# Patient Record
Sex: FEMALE | Race: WHITE | URBAN - METROPOLITAN AREA
[De-identification: names, ages, dates, MRNs, and addresses within clinical notes are randomized per-mention and may not be internally consistent; named-entity substitution may affect disease eponyms.]

---

## 2017-07-03 ENCOUNTER — OFFICE VISIT (OUTPATIENT)
Dept: INTERNAL MEDICINE CLINIC | Age: 27
End: 2017-07-03

## 2017-07-03 VITALS
BODY MASS INDEX: 33.3 KG/M2 | HEART RATE: 86 BPM | SYSTOLIC BLOOD PRESSURE: 106 MMHG | OXYGEN SATURATION: 97 % | WEIGHT: 191 LBS | DIASTOLIC BLOOD PRESSURE: 70 MMHG

## 2017-07-03 DIAGNOSIS — L30.9 DERMATITIS: Primary | ICD-10-CM

## 2017-07-03 PROCEDURE — 99213 OFFICE O/P EST LOW 20 MIN: CPT | Performed by: NURSE PRACTITIONER

## 2017-07-03 RX ORDER — METHYLPREDNISOLONE 4 MG/1
TABLET ORAL
Qty: 1 KIT | Refills: 0 | Status: SHIPPED | OUTPATIENT
Start: 2017-07-03 | End: 2019-06-14 | Stop reason: ALTCHOICE

## 2017-07-21 ENCOUNTER — TELEPHONE (OUTPATIENT)
Dept: INTERNAL MEDICINE CLINIC | Age: 27
End: 2017-07-21

## 2019-04-02 ENCOUNTER — OFFICE VISIT (OUTPATIENT)
Dept: INTERNAL MEDICINE CLINIC | Age: 29
End: 2019-04-02
Payer: COMMERCIAL

## 2019-04-02 VITALS
BODY MASS INDEX: 32.43 KG/M2 | WEIGHT: 186 LBS | OXYGEN SATURATION: 98 % | HEART RATE: 85 BPM | SYSTOLIC BLOOD PRESSURE: 104 MMHG | DIASTOLIC BLOOD PRESSURE: 70 MMHG

## 2019-04-02 DIAGNOSIS — M25.641 STIFFNESS OF JOINTS OF BOTH HANDS: ICD-10-CM

## 2019-04-02 DIAGNOSIS — M25.642 STIFFNESS OF JOINTS OF BOTH HANDS: ICD-10-CM

## 2019-04-02 PROCEDURE — 99213 OFFICE O/P EST LOW 20 MIN: CPT | Performed by: INTERNAL MEDICINE

## 2019-04-02 NOTE — PROGRESS NOTES
Assessment/Plan     1. Stiffness of joints of both hands  No significant systemic symptoms, but in light of age, MCP involvement and FH, lab evaluation for autoimmune etiology is warranted. If positive, will refer to rheumatology. If negative, she will make appointment with hand surgery to evaluate for mechanical issues. Patient will call if symptoms change or worsen. - CBC Auto Differential; Future  - TSH without Reflex; Future  - T4, Free; Future  - Sedimentation Rate; Future  - C-Reactive Protein; Future  - Rheumatoid Factor; Future  - Cyclic Citrul Peptide Antibody, IgG; Future  - TERESA Reflex to Antibody Cascade; Future  - Comprehensive Metabolic Panel; Future         Christina Maylin Greenwood   YOB: 1990    Date of Visit:  4/2/2019    No Known Allergies   Prior to Visit Medications    Medication Sig Taking? Authorizing Provider   Norgestim-Eth Estrad Triphasic (TRINESSA, 28,) 0.18/0.215/0.25 MG-35 MCG TABS Take  by mouth. Yes Historical Provider, MD   Multiple Vitamins-Minerals (MULTIVITAMIN ADULT PO) Take by mouth daily  Historical Provider, MD   methylPREDNISolone (MEDROL, ERIN,) 4 MG tablet By mouth. Medhat Lima, APRN - CNP   omeprazole (PRILOSEC) 40 MG capsule Take 1 capsule by mouth daily. Hermann Blankenship MD       Vitals:    04/02/19 1301   BP: 104/70   Site: Right Upper Arm   Position: Sitting   Cuff Size: Large Adult   Pulse: 85   SpO2: 98%   Weight: 186 lb (84.4 kg)      Body mass index is 32.43 kg/m². Wt Readings from Last 3 Encounters:   04/02/19 186 lb (84.4 kg)   07/03/17 191 lb (86.6 kg)   12/06/16 178 lb (80.7 kg)     BP Readings from Last 3 Encounters:   04/02/19 104/70   07/03/17 106/70   12/06/16 112/76       CC:  Hand pain and stiffness    HPI  6 month history of pain and stiffness in bilateral MCPs, particularly the last 3 joints on each hand. No swelling. Not worse at any particular time of day. No other joint symptoms.   No fevers/night sweats, rashes, hair loss, CP,

## 2019-05-03 ENCOUNTER — TELEPHONE (OUTPATIENT)
Dept: INTERNAL MEDICINE CLINIC | Age: 29
End: 2019-05-03
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
## 2019-06-14 ENCOUNTER — OFFICE VISIT (OUTPATIENT)
Dept: INTERNAL MEDICINE CLINIC | Age: 29
End: 2019-06-14
Payer: COMMERCIAL

## 2019-06-14 VITALS
BODY MASS INDEX: 32.61 KG/M2 | HEIGHT: 64 IN | OXYGEN SATURATION: 98 % | DIASTOLIC BLOOD PRESSURE: 60 MMHG | SYSTOLIC BLOOD PRESSURE: 102 MMHG | HEART RATE: 62 BPM | WEIGHT: 191 LBS

## 2019-06-14 DIAGNOSIS — Z00.00 ANNUAL PHYSICAL EXAM: Primary | ICD-10-CM

## 2019-06-14 PROCEDURE — 99395 PREV VISIT EST AGE 18-39: CPT | Performed by: INTERNAL MEDICINE

## 2019-06-14 ASSESSMENT — PATIENT HEALTH QUESTIONNAIRE - PHQ9
1. LITTLE INTEREST OR PLEASURE IN DOING THINGS: 0
SUM OF ALL RESPONSES TO PHQ9 QUESTIONS 1 & 2: 0
2. FEELING DOWN, DEPRESSED OR HOPELESS: 0
SUM OF ALL RESPONSES TO PHQ QUESTIONS 1-9: 0
SUM OF ALL RESPONSES TO PHQ QUESTIONS 1-9: 0

## 2019-06-14 NOTE — PROGRESS NOTES
History and Physical      Christina Mcwilliams  YOB: 1990    Date of Service:  6/14/2019    Vitals:    06/14/19 1359   BP: 102/60   Pulse: 62   SpO2: 98%   Weight: 191 lb (86.6 kg)   Height: 5' 4.25\" (1.632 m)      Body mass index is 32.53 kg/m². Wt Readings from Last 3 Encounters:   06/14/19 191 lb (86.6 kg)   04/02/19 186 lb (84.4 kg)   07/03/17 191 lb (86.6 kg)     BP Readings from Last 3 Encounters:   06/14/19 102/60   04/02/19 104/70   07/03/17 106/70       Chief Complaint:   Radha Santiago is a 29 y.o. female who presents for complete physical examination. HPI:  No new complaints    Patient Active Problem List   Diagnosis    GERD (gastroesophageal reflux disease)    Arnold-Chiari deformity (HCC)    Allergic rhinitis    Eustachian tube disorder    Obesity, Class I, BMI 30-34.9    Dysmenorrhea    Stiffness of joints of both hands       No Known Allergies  Prior to Visit Medications    Medication Sig Taking? Authorizing Provider   Norgestim-Eth Estrad Triphasic (TRINESSA, 28,) 0.18/0.215/0.25 MG-35 MCG TABS Take  by mouth. Yes Historical Provider, MD   Multiple Vitamins-Minerals (MULTIVITAMIN ADULT PO) Take by mouth daily  Historical Provider, MD   omeprazole (PRILOSEC) 40 MG capsule Take 1 capsule by mouth daily.   Ariadna Garnica MD        Past Medical History:   Diagnosis Date    Allergic rhinitis     Arnold-Chiari deformity (HCC)     Dysmenorrhea     Eustachian tube disorder     Gallstones     GERD (gastroesophageal reflux disease)      Past Surgical History:   Procedure Laterality Date    ADENOIDECTOMY  1993    BRAIN SURGERY  12/09    Cyst removal related to Chiari malformation    BRAIN SURGERY  4/14    Chiari decompression    CHOLECYSTECTOMY  3/24/16    TYMPANOSTOMY TUBE PLACEMENT Bilateral     x11- last 2011     Family History   Problem Relation Age of Onset    Anxiety Disorder Sister     Diabetes Maternal Uncle     Colon Cancer Paternal Grandmother 68    Esophageal Cancer Maternal Grandfather 76    Hypertension Mother     Rheum Arthritis Maternal Grandmother     Rheum Arthritis Mother        Review of Systems:  A comprehensive review of systems was negative except for: chronic bilateral ear pain- [facility]     Physical Exam   Constitutional: She is oriented to person, place, and time. She appears well-developed and well-nourished. No distress. HENT:   Head: Normocephalic and atraumatic. Right Ear: Tympanic membrane, external ear and ear canal normal.   Left Ear: Tympanic membrane, external ear and ear canal normal.   Mouth/Throat: Oropharynx is clear and moist and mucous membranes are normal.   Eyes: Pupils are equal, round, and reactive to light. Conjunctivae and lids are normal.   Neck: Neck supple. Carotid bruit is not present. No thyroid mass and no thyromegaly present. Cardiovascular: Normal rate, regular rhythm, normal heart sounds and intact distal pulses. Exam reveals no gallop and no friction rub. No murmur heard. Pulmonary/Chest: Effort normal and breath sounds normal. No respiratory distress. She has no wheezes. She has no rhonchi. She has no rales. Abdominal: Soft. Normal aorta and bowel sounds are normal. She exhibits no distension and no mass. There is no hepatosplenomegaly. There is no tenderness. Musculoskeletal: Normal range of motion. She exhibits no edema or tenderness. Lymphadenopathy:     She has no cervical adenopathy. Neurological: She is alert and oriented to person, place, and time. She has normal reflexes. Coordination normal.   Skin: Skin is warm and dry. No rash noted. No erythema. No suspicious lesions. Psychiatric: She has a normal mood and affect.  Her speech is normal and behavior is normal. Judgment and thought content normal. Cognition and memory are normal.     Preventive Care:  Hx abnormal PAP: no  Eye exam within the past 2 years: yes  Dental exam every 6 months: yes  Seatbelt used consistently: yes  Smoke and carbon monoxide detectors in home: yes  Advance Directive: N, Not Received  Diet: Weight Watchers Diet  Exercise: Walking/running 30 minutes/day 5x/week  Social History     Tobacco Use    Smoking status: Never Smoker    Smokeless tobacco: Never Used   Substance Use Topics    Alcohol use: Yes     Comment: Social     Social History     Substance and Sexual Activity   Sexual Activity Never       No results found for: LDLCALC, LDLDIRECT, TRIGLYCFAST, TRIG, HDL  Lab Results   Component Value Date    GLUCOSE 81 04/02/2019     Lab Results   Component Value Date     04/02/2019    K 4.0 04/02/2019    BUN 15 04/02/2019    CREATININE 1.0 04/02/2019    LABGLOM >60 04/02/2019    GFRAA >60 04/02/2019    CALCIUM 8.8 04/02/2019     Lab Results   Component Value Date    ALT 10 04/02/2019    AST 12 (L) 04/02/2019     Lab Results   Component Value Date    HGB 13.1 04/02/2019     Lab Results   Component Value Date    TSH 1.32 04/02/2019        Immunization History   Administered Date(s) Administered    DTaP 02/04/1991, 04/15/1991, 05/31/1991, 06/06/1992, 03/29/1996    HPV Gardasil Quadrivalent 07/01/2008    Hepatitis B, unspecified formulation 03/31/2000, 05/11/2000, 10/11/2000    Hib, unspecified formulation 02/04/1991, 04/15/1991, 05/31/1991, 06/06/1992    MMR 06/06/1992, 03/29/2002    Meningococcal MCV4P (Menactra) 07/01/2008    OPV 02/04/1991, 04/15/1991, 06/06/1992, 03/29/1996    Td, unspecified formulation 10/04/2002    Tdap (Boostrix, Adacel) 05/27/2008, 05/04/2019       There are no preventive care reminders to display for this patient. Assessment/Plan:  1. Annual physical exam  Personalized Preventive Plan is provided to patient in written form- see Patient Instructions   - Patient has no Advanced Directive, so was encouraged to complete this document and forward a copy to be scanned into the electronic medical record:  additional information provided. - Patient counseled on diet and exercise.    - Wellness form completed. Return in about 1 year (around 6/14/2020) for CPE.

## 2019-06-14 NOTE — PATIENT INSTRUCTIONS
Patient Education        Well Visit, Ages 25 to 48: Care Instructions  Your Care Instructions    Physical exams can help you stay healthy. Your doctor has checked your overall health and may have suggested ways to take good care of yourself. He or she also may have recommended tests. At home, you can help prevent illness with healthy eating, regular exercise, and other steps. Follow-up care is a key part of your treatment and safety. Be sure to make and go to all appointments, and call your doctor if you are having problems. It's also a good idea to know your test results and keep a list of the medicines you take. How can you care for yourself at home? · Reach and stay at a healthy weight. This will lower your risk for many problems, such as obesity, diabetes, heart disease, and high blood pressure. · Get at least 30 minutes of physical activity on most days of the week. Walking is a good choice. You also may want to do other activities, such as running, swimming, cycling, or playing tennis or team sports. Discuss any changes in your exercise program with your doctor. · Do not smoke or allow others to smoke around you. If you need help quitting, talk to your doctor about stop-smoking programs and medicines. These can increase your chances of quitting for good. · Talk to your doctor about whether you have any risk factors for sexually transmitted infections (STIs). Having one sex partner (who does not have STIs and does not have sex with anyone else) is a good way to avoid these infections. · Use birth control if you do not want to have children at this time. Talk with your doctor about the choices available and what might be best for you. · Protect your skin from too much sun. When you're outdoors from 10 a.m. to 4 p.m., stay in the shade or cover up with clothing and a hat with a wide brim. Wear sunglasses that block UV rays.  Even when it's cloudy, put broad-spectrum sunscreen (SPF 30 or higher) on any sexually transmitted infections (STIs). Ask whether you should have tests for STIs. You may be at risk if you have sex with more than one person, especially if you do not wear a condom. · Testicular cancer exam. Ask your doctor whether you should check your testicles regularly. · Prostate exam. Talk to your doctor about whether you should have a blood test (called a PSA test) for prostate cancer. Experts differ on whether and when men should have this test. Some experts suggest it if you are older than 39 and are -American or have a father or brother who got prostate cancer when he was younger than 72. When should you call for help? Watch closely for changes in your health, and be sure to contact your doctor if you have any problems or symptoms that concern you. Where can you learn more? Go to https://Contin[employer] Man[employer] ServicespeTerranovaeb.health"[employer]". org and sign in to your Soum account. Enter P072 in the Go World! box to learn more about \"Well Visit, Ages 25 to 48: Care Instructions. \"     If you do not have an account, please click on the \"Sign Up Now\" link. Current as of: December 13, 2018  Content Version: 12.0  © 8300-8452 Healthwise, Incorporated. Care instructions adapted under license by [facility] ([facility]). If you have questions about a medical condition or this instruction, always ask your healthcare professional. [address] any warranty or liability for your use of this information.

## 2019-07-17 ENCOUNTER — TELEPHONE (OUTPATIENT)
Dept: INTERNAL MEDICINE CLINIC | Age: 29
End: 2019-07-17

## 2019-07-17 NOTE — TELEPHONE ENCOUNTER
Patient states she saw her psychiatrist  Dr. Dennis Tovar and was told that she needed to schedule with Dr. Ananya Causey as soon as possible for Anxiety. Patient states Dr. Dennis Tovar told her she would speak with Dr. Ananya Causey regarding this. When can patient be scheduled? Aware doctor  is out of the office today  Please contact patient @ phone # provided. Patient is available anytime to come in.

## 2019-07-18 ENCOUNTER — OFFICE VISIT (OUTPATIENT)
Dept: INTERNAL MEDICINE CLINIC | Age: 29
End: 2019-07-18
Payer: COMMERCIAL

## 2019-07-18 VITALS
BODY MASS INDEX: 32.36 KG/M2 | DIASTOLIC BLOOD PRESSURE: 60 MMHG | OXYGEN SATURATION: 99 % | SYSTOLIC BLOOD PRESSURE: 108 MMHG | HEART RATE: 74 BPM | WEIGHT: 190 LBS

## 2019-07-18 DIAGNOSIS — F41.9 ANXIETY: Primary | ICD-10-CM

## 2019-07-18 DIAGNOSIS — Z13.31 POSITIVE DEPRESSION SCREENING: ICD-10-CM

## 2019-07-18 DIAGNOSIS — F32.1 CURRENT MODERATE EPISODE OF MAJOR DEPRESSIVE DISORDER WITHOUT PRIOR EPISODE (HCC): ICD-10-CM

## 2019-07-18 PROCEDURE — G8431 POS CLIN DEPRES SCRN F/U DOC: HCPCS | Performed by: INTERNAL MEDICINE

## 2019-07-18 PROCEDURE — 99214 OFFICE O/P EST MOD 30 MIN: CPT | Performed by: INTERNAL MEDICINE

## 2019-07-18 PROCEDURE — G0444 DEPRESSION SCREEN ANNUAL: HCPCS | Performed by: INTERNAL MEDICINE

## 2019-07-18 RX ORDER — CITALOPRAM 20 MG/1
20 TABLET ORAL DAILY
Qty: 30 TABLET | Refills: 1 | Status: SHIPPED | OUTPATIENT
Start: 2019-07-18 | End: 2019-09-03 | Stop reason: SDUPTHER

## 2019-07-18 ASSESSMENT — PATIENT HEALTH QUESTIONNAIRE - PHQ9
6. FEELING BAD ABOUT YOURSELF - OR THAT YOU ARE A FAILURE OR HAVE LET YOURSELF OR YOUR FAMILY DOWN: 3
7. TROUBLE CONCENTRATING ON THINGS, SUCH AS READING THE NEWSPAPER OR WATCHING TELEVISION: 0
5. POOR APPETITE OR OVEREATING: 3
SUM OF ALL RESPONSES TO PHQ QUESTIONS 1-9: 15
3. TROUBLE FALLING OR STAYING ASLEEP: 0
8. MOVING OR SPEAKING SO SLOWLY THAT OTHER PEOPLE COULD HAVE NOTICED. OR THE OPPOSITE, BEING SO FIGETY OR RESTLESS THAT YOU HAVE BEEN MOVING AROUND A LOT MORE THAN USUAL: 0
SUM OF ALL RESPONSES TO PHQ9 QUESTIONS 1 & 2: 6
SUM OF ALL RESPONSES TO PHQ QUESTIONS 1-9: 15
4. FEELING TIRED OR HAVING LITTLE ENERGY: 3
1. LITTLE INTEREST OR PLEASURE IN DOING THINGS: 3
2. FEELING DOWN, DEPRESSED OR HOPELESS: 3
9. THOUGHTS THAT YOU WOULD BE BETTER OFF DEAD, OR OF HURTING YOURSELF: 0
10. IF YOU CHECKED OFF ANY PROBLEMS, HOW DIFFICULT HAVE THESE PROBLEMS MADE IT FOR YOU TO DO YOUR WORK, TAKE CARE OF THINGS AT HOME, OR GET ALONG WITH OTHER PEOPLE: 2

## 2019-07-18 NOTE — PROGRESS NOTES
other people?  2 - -     Interpretation of Total Score Total Score Depression Severity: 1-4 = Minimal depression, 5-9 = Mild depression, 10-14 = Moderate depression, 15-19 = Moderately severe depression, 20-27 = Severe depression

## 2019-09-03 ENCOUNTER — OFFICE VISIT (OUTPATIENT)
Dept: INTERNAL MEDICINE CLINIC | Age: 29
End: 2019-09-03
Payer: COMMERCIAL

## 2019-09-03 VITALS
WEIGHT: 193 LBS | OXYGEN SATURATION: 98 % | DIASTOLIC BLOOD PRESSURE: 68 MMHG | HEART RATE: 70 BPM | SYSTOLIC BLOOD PRESSURE: 116 MMHG | BODY MASS INDEX: 32.87 KG/M2

## 2019-09-03 DIAGNOSIS — E66.9 OBESITY, CLASS I, BMI 30-34.9: ICD-10-CM

## 2019-09-03 DIAGNOSIS — F32.5 MAJOR DEPRESSIVE DISORDER WITH SINGLE EPISODE, IN FULL REMISSION (HCC): ICD-10-CM

## 2019-09-03 DIAGNOSIS — F41.9 ANXIETY: Primary | ICD-10-CM

## 2019-09-03 PROCEDURE — 99214 OFFICE O/P EST MOD 30 MIN: CPT | Performed by: INTERNAL MEDICINE

## 2019-09-03 RX ORDER — CITALOPRAM 20 MG/1
20 TABLET ORAL DAILY
Qty: 90 TABLET | Refills: 1 | Status: SHIPPED | OUTPATIENT
Start: 2019-09-03 | End: 2020-02-21

## 2020-01-31 ENCOUNTER — OFFICE VISIT (OUTPATIENT)
Dept: INTERNAL MEDICINE CLINIC | Age: 30
End: 2020-01-31
Payer: COMMERCIAL

## 2020-01-31 VITALS
HEART RATE: 81 BPM | SYSTOLIC BLOOD PRESSURE: 104 MMHG | BODY MASS INDEX: 33.04 KG/M2 | WEIGHT: 194 LBS | TEMPERATURE: 98.8 F | DIASTOLIC BLOOD PRESSURE: 60 MMHG | OXYGEN SATURATION: 97 %

## 2020-01-31 PROCEDURE — 99213 OFFICE O/P EST LOW 20 MIN: CPT | Performed by: INTERNAL MEDICINE

## 2020-01-31 NOTE — PROGRESS NOTES
Assessment/Plan     1. Viral URI  Supportive care guidelines provided. She will call if purulent nasal discharge or sputum develops, or if condition otherwise worsens. Gillian Watkins   YOB: 1990    Date of Visit:  1/31/2020    No Known Allergies   Prior to Visit Medications    Medication Sig Taking? Authorizing Provider   citalopram (CELEXA) 20 MG tablet Take 1 tablet by mouth daily Yes Marina Lowry MD   Multiple Vitamins-Minerals (MULTIVITAMIN ADULT PO) Take by mouth daily Yes Historical Provider, MD   Norgestim-Eth Estrad Triphasic (TRINESSA, 28,) 0.18/0.215/0.25 MG-35 MCG TABS Take  by mouth. Historical Provider, MD       Vitals:    01/31/20 1003   BP: 104/60   Pulse: 81   Temp: 98.8 °F (37.1 °C)   TempSrc: Oral   SpO2: 97%   Weight: 194 lb (88 kg)      Body mass index is 33.04 kg/m². Wt Readings from Last 3 Encounters:   01/31/20 194 lb (88 kg)   09/03/19 193 lb (87.5 kg)   07/18/19 190 lb (86.2 kg)     BP Readings from Last 3 Encounters:   01/31/20 104/60   09/03/19 116/68   07/18/19 108/60       CC:  Cough    HPI  Respiratory Symptoms:  Patient complains of 3 day(s) history of clear nasal discharge, nasal congestion, sore throat and productive cough: Sputum is clear  and white. Symptoms have been improving with time. She denies any other symptoms . Relevant PMH: No pertinent PMH. Smoking history:  She  reports that she has never smoked. She has never used smokeless tobacco. She has had ill contacts with URI symptoms. Treatment to date: Mucinex, Flonase. Received flu shot this year. Review of Systems  As documented in HPI    Social History     Tobacco Use    Smoking status: Never Smoker    Smokeless tobacco: Never Used   Substance Use Topics    Alcohol use: Yes     Comment: Social        Physical Exam  Constitutional:       General: She is not in acute distress. Appearance: She is well-developed.    HENT:      Right Ear: Tympanic membrane, ear canal and external ear normal.      Left Ear: Tympanic membrane, ear canal and external ear normal.      Mouth/Throat:      Pharynx: Posterior oropharyngeal erythema present. Eyes:      Conjunctiva/sclera: Conjunctivae normal.   Neck:      Musculoskeletal: Neck supple. Pulmonary:      Effort: Pulmonary effort is normal. No respiratory distress. Breath sounds: Normal breath sounds. No wheezing, rhonchi or rales. Lymphadenopathy:      Cervical: Cervical adenopathy present. Skin:     General: Skin is warm and dry. Findings: No erythema or rash. Neurological:      Mental Status: She is alert and oriented to person, place, and time.    Psychiatric:         Speech: Speech normal.         Behavior: Behavior normal.

## 2020-02-21 RX ORDER — CITALOPRAM 20 MG/1
TABLET ORAL
Qty: 90 TABLET | Refills: 0 | Status: SHIPPED | OUTPATIENT
Start: 2020-02-21 | End: 2020-03-02 | Stop reason: SDUPTHER

## 2020-03-02 ENCOUNTER — E-VISIT (OUTPATIENT)
Dept: INTERNAL MEDICINE CLINIC | Age: 30
End: 2020-03-02
Payer: COMMERCIAL

## 2020-03-02 ENCOUNTER — TELEPHONE (OUTPATIENT)
Dept: INTERNAL MEDICINE CLINIC | Age: 30
End: 2020-03-02

## 2020-03-02 PROCEDURE — 99421 OL DIG E/M SVC 5-10 MIN: CPT | Performed by: INTERNAL MEDICINE

## 2020-03-02 RX ORDER — CITALOPRAM 20 MG/1
TABLET ORAL
Qty: 90 TABLET | Refills: 1 | Status: SHIPPED | OUTPATIENT
Start: 2020-03-02 | End: 2020-11-16

## 2020-03-02 ASSESSMENT — ANXIETY QUESTIONNAIRES
7. FEELING AFRAID AS IF SOMETHING AWFUL MIGHT HAPPEN: 0-NOT AT ALL
GAD7 TOTAL SCORE: 0
1. FEELING NERVOUS, ANXIOUS, OR ON EDGE: NOT AT ALL
6. BECOMING EASILY ANNOYED OR IRRITABLE: 0-NOT AT ALL
2. NOT BEING ABLE TO STOP OR CONTROL WORRYING: 0-NOT AT ALL
2. NOT BEING ABLE TO STOP OR CONTROL WORRYING: NOT AT ALL
7. FEELING AFRAID AS IF SOMETHING AWFUL MIGHT HAPPEN: NOT AT ALL
4. TROUBLE RELAXING: 0-NOT AT ALL
3. WORRYING TOO MUCH ABOUT DIFFERENT THINGS: 0-NOT AT ALL
5. BEING SO RESTLESS THAT IT IS HARD TO SIT STILL: 0-NOT AT ALL
1. FEELING NERVOUS, ANXIOUS, OR ON EDGE: 0-NOT AT ALL
6. BECOMING EASILY ANNOYED OR IRRITABLE: NOT AT ALL
3. WORRYING TOO MUCH ABOUT DIFFERENT THINGS: NOT AT ALL
4. TROUBLE RELAXING: NOT AT ALL
GAD7 TOTAL SCORE: 0
5. BEING SO RESTLESS THAT IT IS HARD TO SIT STILL: NOT AT ALL

## 2020-03-02 ASSESSMENT — PATIENT HEALTH QUESTIONNAIRE - PHQ9
5. POOR APPETITE OR OVEREATING: 0
7. TROUBLE CONCENTRATING ON THINGS, SUCH AS READING THE NEWSPAPER OR WATCHING TELEVISION: 0
3. TROUBLE FALLING OR STAYING ASLEEP: NOT AT ALL
SUM OF ALL RESPONSES TO PHQ9 QUESTIONS 1 & 2: 0
4. FEELING TIRED OR HAVING LITTLE ENERGY: NOT AT ALL
8. MOVING OR SPEAKING SO SLOWLY THAT OTHER PEOPLE COULD HAVE NOTICED. OR THE OPPOSITE - BEING SO FIDGETY OR RESTLESS THAT YOU HAVE BEEN MOVING AROUND A LOT MORE THAN USUAL: NOT AT ALL
2. FEELING DOWN, DEPRESSED OR HOPELESS: 0
9. THOUGHTS THAT YOU WOULD BE BETTER OFF DEAD, OR OF HURTING YOURSELF: 0
7. TROUBLE CONCENTRATING ON THINGS, SUCH AS READING THE NEWSPAPER OR WATCHING TELEVISION: NOT AT ALL
SUM OF ALL RESPONSES TO PHQ QUESTIONS 1-9: 0
1. LITTLE INTEREST OR PLEASURE IN DOING THINGS: NOT AT ALL
9. THOUGHTS THAT YOU WOULD BE BETTER OFF DEAD, OR OF HURTING YOURSELF: NOT AT ALL
8. MOVING OR SPEAKING SO SLOWLY THAT OTHER PEOPLE COULD HAVE NOTICED. OR THE OPPOSITE, BEING SO FIGETY OR RESTLESS THAT YOU HAVE BEEN MOVING AROUND A LOT MORE THAN USUAL: 0
SUM OF ALL RESPONSES TO PHQ QUESTIONS 1-9: 0
4. FEELING TIRED OR HAVING LITTLE ENERGY: 0
1. LITTLE INTEREST OR PLEASURE IN DOING THINGS: 0
6. FEELING BAD ABOUT YOURSELF - OR THAT YOU ARE A FAILURE OR HAVE LET YOURSELF OR YOUR FAMILY DOWN: NOT AT ALL
2. FEELING DOWN, DEPRESSED OR HOPELESS: NOT AT ALL
10. IF YOU CHECKED OFF ANY PROBLEMS, HOW DIFFICULT HAVE THESE PROBLEMS MADE IT FOR YOU TO DO YOUR WORK, TAKE CARE OF THINGS AT HOME, OR GET ALONG WITH OTHER PEOPLE: NOT DIFFICULT AT ALL
SUM OF ALL RESPONSES TO PHQ QUESTIONS 1-9: 0
5. POOR APPETITE OR OVEREATING: NOT AT ALL
6. FEELING BAD ABOUT YOURSELF - OR THAT YOU ARE A FAILURE OR HAVE LET YOURSELF OR YOUR FAMILY DOWN: 0

## 2020-03-02 ASSESSMENT — PATIENT HEALTH QUESTIONNAIRE - GENERAL
HAVE YOU BEEN EXPERIENCING AN UNUSUALLY DRY MOUTH: N
HAVE YOU BEEN EXPERIENCING NEW OR WORSENING MUSCLE TWITCHING, SHAKINESS, OR OTHER UNUSUAL CHANGES IN YOUR MUSCLE MOVEMENT: N
HAVE YOU BEEN EXPERIENCING VERY LOW OR VERY HIGH MOODS: N
HAVE YOU BEEN EXPERIENCING NEW OR WORSENING HEADACHES: N
HAVE YOU BEEN EXPERIENCING RACING THOUGHTS OR IMPULSIVE BEHAVIOR: N
HAVE YOU BEEN EXPERIENCING NEW OR WORSENING DIFFICULTY WITH URINATION OR CHANGE IN URINARY PATTERN: N
HAVE YOU BEEN EXPERIENCING ABDOMINAL DISCOMFORT, NAUSEA OR CHANGES IN YOUR BOWEL PATTERN: N
SINCE YOUR LAST VISIT, HAVE YOU EXPERIENCED EPISODES OF FAINTING OR NEAR FAINTING: N
DO YOU HAVE A FASTER HEART RATE THAN USUAL, DOES YOUR HEART RATE FEEL IRREGULAR OR DO YOU FEEL LIKE YOUR HEART IS POUNDING AT REST: N
HAVE YOU BEEN EXPERIENCING HALLUCINATIONS OR CONFUSION: N
HAVE YOU BEEN EXPERIENCING UNEXPLAINED HIGH FEVERS OR EXCESSIVE SWEATING: N
HAVE YOU BEEN EXPERIENCING VIVID DREAMS OR NIGHTMARES THAT INTERFERE WITH YOUR SLEEP: N
HAVE YOU BEEN EXPERIENCING NEW OR WORSENING VISUAL CHANGES: N
HAVE YOU BEEN EXPERIENCING INVOLUNTARY WEIGHT GAIN OR LOSS: N
HAVE YOU BEEN EXPERIENCING NEW OR WORSENING PROBLEMS WITH YOUR SEXUAL FUNCTION: N
DO YOU HAVE ANY NEW RASHES OR UNEXPLAINED ITCHING OR SWELLING: N
HAVE YOU BEEN EXPERIENCING LIGHT HEADEDNESS, WOOZINESS, OR DIZZINESS, ESPECIALLY UPON STANDING FROM SITTING OR LYING POSITIONS: N

## 2020-03-02 NOTE — PROGRESS NOTES
1. Major depressive disorder with single episode, in full remission ([facility] [address]  Well-controlled on current dose of Celexa- continue. If symptoms worsen, will refer to [facility] [place]. 2. Anxiety  See plan for depression.

## 2020-03-02 NOTE — TELEPHONE ENCOUNTER
Patient has an appt tomorrow for a med f/u. She is wondering if ok to do it as an E-visit. Said she discussed this with Dr. Ene Alba previously but last office note says to return in 6 months. Please advise if ok for an e-visit.     Can call [employer] number [phone] or cell phone [phone]

## 2020-11-16 RX ORDER — CITALOPRAM 20 MG/1
TABLET ORAL
Qty: 90 TABLET | Refills: 1 | Status: SHIPPED | OUTPATIENT
Start: 2020-11-16 | End: 2021-05-03

## 2020-11-16 NOTE — TELEPHONE ENCOUNTER
Last appointment: 3/2/2020  Next appointment: 12/3/2020  Last refill: 03/02/2020 # 90 with one refill

## 2020-12-03 ENCOUNTER — OFFICE VISIT (OUTPATIENT)
Dept: INTERNAL MEDICINE CLINIC | Age: 30
End: 2020-12-03
Payer: COMMERCIAL

## 2020-12-03 VITALS
HEART RATE: 76 BPM | WEIGHT: 205 LBS | HEIGHT: 65 IN | SYSTOLIC BLOOD PRESSURE: 102 MMHG | DIASTOLIC BLOOD PRESSURE: 60 MMHG | TEMPERATURE: 96.4 F | BODY MASS INDEX: 34.16 KG/M2

## 2020-12-03 PROCEDURE — 99395 PREV VISIT EST AGE 18-39: CPT | Performed by: INTERNAL MEDICINE

## 2020-12-03 NOTE — PATIENT INSTRUCTIONS
Patient Education        Learning About the Mediterranean Diet  What is the [address]? The Mediterranean diet is a style of eating rather than a diet plan. It features foods eaten in [place], [place], [place] and [place], and other countries along the [facility]. It emphasizes eating foods like fish, fruits, vegetables, beans, high-fiber breads and whole grains, nuts, and olive oil. This style of eating includes limited red meat, cheese, and sweets. Why choose the Mediterranean diet? A Mediterranean-style diet may improve heart health. It contains more fat than other heart-healthy diets. But the fats are mainly from nuts, unsaturated oils (such as fish oils and olive oil), and certain nut or seed oils (such as canola, soybean, or flaxseed oil). These fats may help protect the heart and blood vessels. How can you get started on the Mediterranean diet? Here are some things you can do to switch to a more Mediterranean way of eating. What to eat  · Eat a variety of fruits and vegetables each day, such as grapes, blueberries, tomatoes, broccoli, peppers, figs, olives, spinach, eggplant, beans, lentils, and chickpeas. · Eat a variety of whole-grain foods each day, such as oats, brown rice, and whole wheat bread, pasta, and couscous. · Eat fish at least 2 times a week. Try tuna, salmon, mackerel, lake trout, herring, or sardines. · Eat moderate amounts of low-fat dairy products, such as milk, cheese, or yogurt. · Eat moderate amounts of poultry and eggs. · Choose healthy (unsaturated) fats, such as nuts, olive oil, and certain nut or seed oils like canola, soybean, and flaxseed. · Limit unhealthy (saturated) fats, such as butter, palm oil, and coconut oil. And limit fats found in animal products, such as meat and dairy products made with whole milk. Try to eat red meat only a few times a month in very small amounts. · Limit sweets and desserts to only a few times a week.  This includes sugar-sweetened drinks like soda. The Mediterranean diet may also include red wine with your meal--1 glass each day for women and up to 2 glasses a day for men. Tips for eating at home  · Use herbs, spices, garlic, lemon zest, and citrus juice instead of salt to add flavor to foods. · Add avocado slices to your sandwich instead of aguirre. · Have fish for lunch or dinner instead of red meat. Brush the fish with olive oil, and broil or grill it. · Sprinkle your salad with seeds or nuts instead of cheese. · Cook with olive or canola oil instead of butter or oils that are high in saturated fat. · Switch from 2% milk or whole milk to 1% or fat-free milk. · Dip raw vegetables in a vinaigrette dressing or hummus instead of dips made from mayonnaise or sour cream.  · Have a piece of fruit for dessert instead of a piece of cake. Try baked apples, or have some dried fruit. Tips for eating out  · Try broiled, grilled, baked, or poached fish instead of having it fried or breaded. · Ask your  to have your meals prepared with olive oil instead of butter. · Order dishes made with marinara sauce or sauces made from olive oil. Avoid sauces made from cream or mayonnaise. · Choose whole-grain breads, whole wheat pasta and pizza crust, brown rice, beans, and lentils. · Cut back on butter or margarine on bread. Instead, you can dip your bread in a small amount of olive oil. · Ask for a side salad or grilled vegetables instead of french fries or chips. Where can you learn more? Go to https://Realtime Technologyperoneweb.healthJRKICKZ. org and sign in to your Ch[employer] account. Enter [phone] in the [facility] box to learn more about \"Learning About the Mediterranean Diet. \"     If you do not have an account, please click on the \"Sign Up Now\" link. Current as of: August 22, 2019               Content Version: 12.6  © 8531-5773 [employer]. Care instructions adapted under license by [facility] ([facility]).  If you have questions about a medical condition or this instruction, always ask your healthcare professional. [place] [address] any warranty or liability for your use of this information.

## 2020-12-17 DIAGNOSIS — E66.9 OBESITY, CLASS I, BMI 30-34.9: ICD-10-CM

## 2020-12-17 DIAGNOSIS — Z00.00 ANNUAL PHYSICAL EXAM: ICD-10-CM

## 2020-12-17 LAB
A/G RATIO: 1.5 (ref 1.1–2.2)
ALBUMIN SERPL-MCNC: 4 G/DL (ref 3.4–5)
ALP BLD-CCNC: 86 U/L (ref 40–129)
ALT SERPL-CCNC: 14 U/L (ref 10–40)
ANION GAP SERPL CALCULATED.3IONS-SCNC: 11 MMOL/L (ref 3–16)
AST SERPL-CCNC: 15 U/L (ref 15–37)
BILIRUB SERPL-MCNC: <0.2 MG/DL (ref 0–1)
BUN BLDV-MCNC: 10 MG/DL (ref 7–20)
CALCIUM SERPL-MCNC: 9 MG/DL (ref 8.3–10.6)
CHLORIDE BLD-SCNC: 105 MMOL/L (ref 99–110)
CHOLESTEROL, FASTING: 134 MG/DL (ref 0–199)
CO2: 23 MMOL/L (ref 21–32)
CREAT SERPL-MCNC: 0.7 MG/DL (ref 0.6–1.1)
GFR AFRICAN AMERICAN: >60
GFR NON-AFRICAN AMERICAN: >60
GLOBULIN: 2.6 G/DL
GLUCOSE FASTING: 98 MG/DL (ref 70–99)
HDLC SERPL-MCNC: 62 MG/DL (ref 40–60)
LDL CHOLESTEROL CALCULATED: 60 MG/DL
POTASSIUM SERPL-SCNC: 4.4 MMOL/L (ref 3.5–5.1)
SODIUM BLD-SCNC: 139 MMOL/L (ref 136–145)
TOTAL PROTEIN: 6.6 G/DL (ref 6.4–8.2)
TRIGLYCERIDE, FASTING: 59 MG/DL (ref 0–150)
TSH REFLEX: 1.12 UIU/ML (ref 0.27–4.2)
VLDLC SERPL CALC-MCNC: 12 MG/DL

## 2021-02-24 ENCOUNTER — TELEPHONE (OUTPATIENT)
Dept: INTERNAL MEDICINE CLINIC | Age: 31
End: 2021-02-24

## 2021-05-03 RX ORDER — CITALOPRAM 20 MG/1
TABLET ORAL
Qty: 90 TABLET | Refills: 1 | Status: SHIPPED | OUTPATIENT
Start: 2021-05-03 | End: 2021-11-03

## 2021-05-20 ENCOUNTER — OFFICE VISIT (OUTPATIENT)
Dept: INTERNAL MEDICINE CLINIC | Age: 31
End: 2021-05-20
Payer: COMMERCIAL

## 2021-05-20 VITALS
BODY MASS INDEX: 36 KG/M2 | DIASTOLIC BLOOD PRESSURE: 60 MMHG | WEIGHT: 213 LBS | SYSTOLIC BLOOD PRESSURE: 100 MMHG | HEART RATE: 82 BPM

## 2021-05-20 DIAGNOSIS — Z3A.16 16 WEEKS GESTATION OF PREGNANCY: ICD-10-CM

## 2021-05-20 DIAGNOSIS — H66.90 ACUTE OTITIS MEDIA, UNSPECIFIED OTITIS MEDIA TYPE: Primary | ICD-10-CM

## 2021-05-20 PROCEDURE — 99213 OFFICE O/P EST LOW 20 MIN: CPT | Performed by: INTERNAL MEDICINE

## 2021-05-20 RX ORDER — AZITHROMYCIN 250 MG/1
250 TABLET, FILM COATED ORAL SEE ADMIN INSTRUCTIONS
Qty: 6 TABLET | Refills: 0 | Status: SHIPPED | OUTPATIENT
Start: 2021-05-20 | End: 2021-05-25

## 2021-05-20 RX ORDER — ASPIRIN 81 MG/1
162 TABLET ORAL DAILY
COMMUNITY
End: 2021-12-14

## 2021-05-20 NOTE — PROGRESS NOTES
[facility]  Internal Medicine  Patient Encounter  Zeinab Roberts D.O., [facility]        Chief Complaint   Patient presents with   [facility] Otalgia     Right ear feels full, pops when yawns. Symptoms for 4 days. Denies any other symptoms       HPI: 27 y.o. female seen urgently today with complaint of right ear discomfort and fullness. Symptoms started this past Monday (4 days ago). Patient reports decreased hearing out of the right ear as compared to the left. She also reports clicking and popping particularly when she yawns. She has had a little bit of drainage out of the right ear. Patient states she has had multiple ear infections since childhood. She has had 13 sets of tubes. Her last set of myringotomy tubes were in 2018. She denies any fever, chills, sweats. She denies any sore throat or coughing. Patient has not been swimming. Patient reports she is 16 weeks pregnant. Past Medical History:   Diagnosis Date    Allergic rhinitis     Arnold-Chiari deformity (HCC)     Dysmenorrhea     Eustachian tube disorder     Gallstones     GERD (gastroesophageal reflux disease)     HELLP (hemolytic anemia/elev liver enzymes/low platelets in pregnancy) 10/2020         MEDICATIONS:  Medication Sig   aspirin 81 MG EC tablet Take 162 mg by mouth daily   citalopram (CELEXA) 20 MG tablet TAKE 1 TABLET DAILY   Multiple Vitamins-Minerals (MULTIVITAMIN ADULT PO) Take by mouth daily           Review of Systems - As per HPI      OBJECTIVE:  /60 (Site: Right Upper Arm, Position: Sitting, Cuff Size: Large Adult)   Pulse 82   Wt 213 lb (96.6 kg)   BMI 36.00 kg/m²   GEN: NAD, A&O, Non-toxic  HEENT: NC/AT, KARINE, EOMI, Oral cavity Clear. Left EAC and TM appear normal other than tympanosclerosis. Right EAC reveals some whitish debris along the superior and anterior wall, but there is no erythema or edema. The tympanic membrane does reveal tympanosclerosis but also does appear more flaccid and hyperemic. There does appear to be some yellowish middle ear fluid. No pain with movement of the auricle. There is no evidence of TM perforation  NECK: Supple. No thyromegaly. No JVD  LYMPH: No C/SC nodes. CV: S1 S2 NL, RRR. No murmurs, clicks or rubs. PULM: CTA  GI: Abdomen is soft, NT, BS+, No hepatomegaly. No masses. NEURO: No focal or lateralizing deficits. VASC:  No carotid bruits. Pulses symmetric  SKIN:  No rashes or lesions of concern    ASSESSMENT[de-identified]  Christina was seen today for otalgia. Diagnoses and all orders for this visit:    Acute otitis media, unspecified otitis media type  -     azithromycin (ZITHROMAX) 250 MG tablet; Take 1 tablet by mouth See Admin Instructions for 5 days 500mg on day 1 followed by 250mg on days 2 - 5    16 weeks gestation of pregnancy        Additional Plan:  1. Advised patient to stay well-hydrated  2. Advised patient to call should symptoms persist or worsen or if new symptoms develop. Discussed medications with patient who voiced understanding of their use, indication and potential side effects. Pt also understands the above recommendations. All questions answered. This note was generated completely or in part utilizing Dragon dictation speech recognition software. Occasionally, words are mistranscribed and despite editing, the text may contain inaccuracies due to incorrect word recognition.   If further clarification is needed please contact the office at [phone]       Electronically signed    Guera Alvarez D.O.

## 2021-05-20 NOTE — PATIENT INSTRUCTIONS
Patient Education        Ear Infection (Otitis Media): Care Instructions  Overview     An ear infection may start with a cold and affect the middle ear (otitis media). It can hurt a lot. Most ear infections clear up on their own in a couple of days and do not need antibiotics. Also, antibiotics do not work against viruses, which may be the cause of your infection. Regular doses of pain relievers are the best way to reduce your fever and help you feel better. Follow-up care is a key part of your treatment and safety. Be sure to make and go to all appointments, and call your doctor if you are having problems. It's also a good idea to know your test results and keep a list of the medicines you take. How can you care for yourself at home? · Take pain medicines exactly as directed. ? If the doctor gave you a prescription medicine for pain, take it as prescribed. ? If you are not taking a prescription pain medicine, take an over-the-counter medicine, such as acetaminophen (Tylenol), ibuprofen (Advil, Motrin), or naproxen (Aleve). Read and follow all instructions on the label. ? Do not take two or more pain medicines at the same time unless the doctor told you to. Many pain medicines have acetaminophen, which is Tylenol. Too much acetaminophen (Tylenol) can be harmful. · Plan to take a full dose of pain reliever before bedtime. Getting enough sleep will help you get better. · Try a warm, moist washcloth on the ear. It may help relieve pain. · If your doctor prescribed antibiotics, take them as directed. Do not stop taking them just because you feel better. You need to take the full course of antibiotics. When should you call for help? Call your doctor now or seek immediate medical care if:    · You have new or increasing ear pain.     · You have new or increasing pus or blood draining from your ear.     · You have a fever with a stiff neck or a severe headache.    Watch closely for changes in your health, and be sure to contact your doctor if:    · You have new or worse symptoms.     · You are not getting better after taking an antibiotic for 2 days. Where can you learn more? Go to https://iHighpeZero2IPO.UBmatrix. org and sign in to your UNI[employer] account. Enter T710 in the Unsocial box to learn more about \"Ear Infection (Otitis Media): Care Instructions. \"     If you do not have an account, please click on the \"Sign Up Now\" link. Current as of: December 2, 2020               Content Version: 12.8  © 1376-8785 [employer]wise, Incorporated. Care instructions adapted under license by [facility] ([facility]). If you have questions about a medical condition or this instruction, always ask your healthcare professional. [address] any warranty or liability for your use of this information.

## 2021-07-20 ENCOUNTER — TELEPHONE (OUTPATIENT)
Dept: INTERNAL MEDICINE CLINIC | Age: 31
End: 2021-07-20

## 2021-07-20 NOTE — TELEPHONE ENCOUNTER
Left message for patient that Dr Adali Langley is out of the office and she should follow up with her OB.   Note completed

## 2021-07-20 NOTE — TELEPHONE ENCOUNTER
Patient called stating that she just found out that she is 25 weeks pregnant and has been having diarrhea since last week. She wants to now if she should be seen by dr lu or if this is something for her OBGYN. ?

## 2021-11-03 RX ORDER — CITALOPRAM 20 MG/1
TABLET ORAL
Qty: 90 TABLET | Refills: 0 | Status: SHIPPED | OUTPATIENT
Start: 2021-11-03 | End: 2021-12-14 | Stop reason: SDUPTHER

## 2021-11-03 NOTE — TELEPHONE ENCOUNTER
Last appointment: 12/3/2020  Next appointment: Visit date not found  Last refill: 5/3/21  Sent [employer] message to schedule next appointment due in December.

## 2021-12-13 PROBLEM — M25.641 STIFFNESS OF JOINTS OF BOTH HANDS: Status: RESOLVED | Noted: 2019-04-02 | Resolved: 2021-12-13

## 2021-12-13 PROBLEM — M25.642 STIFFNESS OF JOINTS OF BOTH HANDS: Status: RESOLVED | Noted: 2019-04-02 | Resolved: 2021-12-13

## 2021-12-13 NOTE — PROGRESS NOTES
2021    Yousif Jain (: 1990) is a 32 y.o. female who presents for a preventive medicine evaluation. Patient Active Problem List   Diagnosis    GERD (gastroesophageal reflux disease)    Arnold-Chiari deformity (HCC)    Allergic rhinitis    Eustachian tube disorder    Obesity due to excess calories with serious comorbidity    Dysmenorrhea    Major depressive disorder with single episode[facility]    Anxiety     Review of Systems   Constitutional: Negative. HENT: Negative. Eyes: Negative. Respiratory: Negative. Cardiovascular: Negative. Gastrointestinal: Negative. Genitourinary: Negative. Musculoskeletal: Negative. Skin: Negative. Neurological: Negative. Psychiatric/Behavioral: Negative. No Known Allergies  Prior to Visit Medications    Medication Sig Taking?  Authorizing Provider   Norgestim-Eth Estrad Triphasic 0.18/0.215/0.25 MG-35 MCG TABS Take 1 tablet by mouth daily Yes Historical Provider, MD   citalopram (CELEXA) 20 MG tablet TAKE 1 TABLET DAILY Yes Taylor Gonzalez MD   Multiple Vitamins-Minerals (MULTIVITAMIN ADULT PO) Take by mouth daily Yes Historical Provider, MD        Past Medical History:   Diagnosis Date    Allergic rhinitis     [facility]     Dysmenorrhea     Eustachian tube disorder     Gallstones     GERD (gastroesophageal reflux disease)     HELLP (hemolytic anemia/elev liver enzymes/low platelets in pregnancy) 10/2020     Past Surgical History:   Procedure Laterality Date    ADENOIDECTOMY  1993    BRAIN SURGERY  2009    Cyst removal related to Chiari malformation    BRAIN SURGERY  2014    Chiari decompression     SECTION  2020     SECTION  10/29/2021    CHOLECYSTECTOMY  2016    TYMPANOSTOMY TUBE PLACEMENT Bilateral     x11- last      Family History   Problem Relation Age of Onset    Anxiety Disorder Sister     Diabetes Maternal Uncle     Colon Cancer Paternal Grandmother 68    Esophageal Cancer Maternal Grandfather 76    Hypertension Mother     Rheum Arthritis Mother     Rheum Arthritis Maternal Grandmother        Vitals:    12/14/21 1351   BP: 118/74   Pulse: 68   SpO2: 98%   Weight: 213 lb (96.6 kg)   Height: 5' 5\" (1.651 m)      Estimated body mass index is 35.45 kg/m² as calculated from the following:    Height as of this encounter: 5' 5\" (1.651 m). Weight as of this encounter: 213 lb (96.6 kg). Wt Readings from Last 3 Encounters:   12/14/21 213 lb (96.6 kg)   05/20/21 213 lb (96.6 kg)   12/03/20 205 lb (93 kg)     BP Readings from Last 3 Encounters:   12/14/21 118/74   05/20/21 100/60   12/03/20 102/60     Physical Exam  Constitutional:       General: She is not in acute distress. Appearance: She is well-developed. HENT:      Head: Normocephalic and atraumatic. Right Ear: Tympanic membrane, ear canal and external ear normal.      Left Ear: Tympanic membrane, ear canal and external ear normal.   Eyes:      General: Lids are normal.      Conjunctiva/sclera: Conjunctivae normal.      Pupils: Pupils are equal, round, and reactive to light. Neck:      Thyroid: No thyroid mass or thyromegaly. Vascular: No carotid bruit. Cardiovascular:      Rate and Rhythm: Normal rate and regular rhythm. Heart sounds: Normal heart sounds. No murmur heard. No friction rub. No gallop. Pulmonary:      Effort: Pulmonary effort is normal. No respiratory distress. Breath sounds: Normal breath sounds. No wheezing, rhonchi or rales. Chest:      Comments: Breast exam deferred to GYN  Abdominal:      General: Bowel sounds are normal. There is no distension. Palpations: Abdomen is soft. There is no mass. Tenderness: There is no abdominal tenderness. Musculoskeletal:         General: Normal range of motion. Cervical back: Neck supple. Right lower leg: No edema. Left lower leg: No edema.    Lymphadenopathy: Cervical: No cervical adenopathy. Skin:     General: Skin is warm and dry. Findings: No erythema or rash. Comments: No suspicious lesions. Neurological:      Mental Status: She is alert and oriented to person, place, and time. Coordination: Coordination normal.      Deep Tendon Reflexes: Reflexes are normal and symmetric. Psychiatric:         Speech: Speech normal.         Behavior: Behavior normal.         Thought Content: Thought content normal.         Judgment: Judgment normal.       Lab Results   Component Value Date    CHOLFAST 134 12/17/2020    TRIGLYCFAST 59 12/17/2020    HDL 62 (H) 12/17/2020    LDLCALC 60 12/17/2020     Lab Results   Component Value Date    GLUF 98 12/17/2020     Lab Results   Component Value Date     12/17/2020    K 4.4 12/17/2020    BUN 10 12/17/2020    CREATININE 0.7 12/17/2020    LABGLOM >60 12/17/2020    GFRAA >60 12/17/2020    CALCIUM 9.0 12/17/2020     Lab Results   Component Value Date    ALT 14 12/17/2020    AST 15 12/17/2020     Lab Results   Component Value Date    HGB 13.1 04/02/2019     Lab Results   Component Value Date    TSHREFLEX 1.12 12/17/2020    TSH 1.32 04/02/2019        Preventive Care:  Eye exam within the past 2 years? yes  Dental exam within the past year? yes  Seatbelt used consistently: yes  Working smoke and carbon monoxide detectors in home: yes   Avoiding any major food groups?  No  Exercising at least 150 minutes/week? working on [email or web address] section  Advance Directive: N <no information>    Social History     Tobacco Use    Smoking status: Never Smoker    Smokeless tobacco: Never Used   Substance Use Topics    Alcohol use: Yes     Comment: Social     Social History     Substance and Sexual Activity   Sexual Activity Never       Immunization History   Administered Date(s) Administered    COVID-19, Renteria Peter, PF, 30mcg/0.3mL 01/29/2021, 02/20/2021    DTaP 02/04/1991, 04/15/1991, 05/31/1991, 06/06/1992, 03/29/1996    DTaP vaccine 02/04/1991, 04/15/1991, 05/31/1991, 06/06/1992, 03/29/1996    HPV Quadrivalent (Gardasil) 07/01/2008    Hepatitis B 03/31/2000, 05/11/2000, 10/11/2000    Hepatitis B Ped/Adol (Engerix-B, Recombivax HB) 03/31/2000, 03/31/2000, 05/11/2000, 05/11/2000, 10/11/2000, 10/11/2000    Hib PRP-OMP (PedvaxHIB) 02/04/1991, 04/15/1991, 05/31/1991, 06/06/1992    Hib, unspecified 02/04/1991, 04/15/1991, 05/31/1991, 06/06/1992    Influenza Virus Vaccine 10/07/2020    MMR 06/06/1992, 03/29/2002, 10/07/2020    Meningococcal MCV4P (Menactra) 07/01/2008    Polio OPV 02/04/1991, 04/15/1991, 06/06/1992, 03/29/1996    Td (Adult), 5 Lf Tetanus Toxoid, Pf (Tenivac, Decavac) 10/04/2002    Td, unspecified formulation 10/04/2002    Tdap (Boostrix, Adacel) 05/27/2008, 05/04/2019, 08/20/2020     Health Maintenance   Topic Date Due    COVID-19 Vaccine (3 - Booster for Renteria Peter series) 08/20/2021    Cervical cancer screen  02/21/2022    DTaP/Tdap/Td vaccine (10 - Td or Tdap) 08/31/2031    Hepatitis B vaccine  Completed    Hib vaccine  Completed    Meningococcal (ACWY) vaccine  Completed    Flu vaccine  Completed    Hepatitis A vaccine  Aged Out    Pneumococcal 0-64 years Vaccine  Aged Out    Varicella vaccine  Discontinued    Hepatitis C screen  Discontinued    HIV screen  Discontinued       Assessment/Plan:  Encounter for well adult exam without abnormal findings  Patient has no Advanced Directive, so was encouraged to complete this document and forward a copy to be scanned into the electronic medical record. Patient tested positive for COVID at the end of October, so will defer booster in January.  -     Lipid, Fasting; Future  -     Comprehensive Metabolic Panel, Fasting; Future  -     TSH with Reflex; Future    Return in about 6 months (around 6/14/2022). --Marck Rodriguez MD on 12/14/2021 at 2:26 PM    An electronic signature was used to authenticate this note.

## 2021-12-14 ENCOUNTER — OFFICE VISIT (OUTPATIENT)
Dept: INTERNAL MEDICINE CLINIC | Age: 31
End: 2021-12-14
Payer: COMMERCIAL

## 2021-12-14 VITALS
DIASTOLIC BLOOD PRESSURE: 74 MMHG | OXYGEN SATURATION: 98 % | BODY MASS INDEX: 35.49 KG/M2 | HEIGHT: 65 IN | SYSTOLIC BLOOD PRESSURE: 118 MMHG | HEART RATE: 68 BPM | WEIGHT: 213 LBS

## 2021-12-14 DIAGNOSIS — Z00.00 ENCOUNTER FOR WELL ADULT EXAM WITHOUT ABNORMAL FINDINGS: Primary | ICD-10-CM

## 2021-12-14 PROCEDURE — 99395 PREV VISIT EST AGE 18-39: CPT | Performed by: INTERNAL MEDICINE

## 2021-12-14 RX ORDER — NORGESTIMATE AND ETHINYL ESTRADIOL 7DAYSX3 28
1 KIT ORAL DAILY
COMMUNITY
Start: 2021-12-10

## 2021-12-14 RX ORDER — CITALOPRAM 20 MG/1
TABLET ORAL
Qty: 90 TABLET | Refills: 1 | Status: SHIPPED | OUTPATIENT
Start: 2021-12-14 | End: 2022-05-24

## 2021-12-14 ASSESSMENT — ENCOUNTER SYMPTOMS
RESPIRATORY NEGATIVE: 1
EYES NEGATIVE: 1
GASTROINTESTINAL NEGATIVE: 1

## 2021-12-27 ENCOUNTER — TELEPHONE (OUTPATIENT)
Dept: INTERNAL MEDICINE CLINIC | Age: 31
End: 2021-12-27

## 2021-12-27 NOTE — TELEPHONE ENCOUNTER
Patient is calling for an update on the status of the [employer] screening form she dropped off on Monday of last week. Please contact patient at the number provided to advise.

## 2022-01-03 NOTE — PROGRESS NOTES
Date of Visit:  2022    CC: Elizabeth Tolbert (: 1990) is a 32 y.o. female, established patient, here for evaluation/re-evaluation of the following medical concerns:    ASSESSMENT/PLAN:  Inflammation of right sacroiliac joint ([address]  Likely triggered by long care ride. Continue Tylenol, but may switch to Advil or Aleve after oral steroids complete. Topicals discussed, such as Biofreeze, diclofenac gel, lidocaine and heat patches. If no significant improvement with chiropractic, will refer to PT.   -     methylPREDNISolone (MEDROL DOSEPACK) 4 MG tablet; Take as directed for 6 days. , Disp-1 kit, R-0Normal     Return if symptoms worsen or fail to improve. Vitals:    22 0859   BP: 94/60   Site: Right Upper Arm   Position: Sitting   Cuff Size: Large Adult   Pulse: 78   Weight: 216 lb (98 kg)      Estimated body mass index is 35.94 kg/m² as calculated from the following:    Height as of 21: 5' 5\" (1.651 m). Weight as of this encounter: 216 lb (98 kg). Wt Readings from Last 3 Encounters:   22 216 lb (98 kg)   21 213 lb (96.6 kg)   21 213 lb (96.6 kg)     BP Readings from Last 3 Encounters:   22 94/60   21 118/74   21 100/60     HPI  Joint Symptoms:  Patient complains of a 1 week history of pain in right hip. Pain is persistent, throbbing in nature, and is moderate in intensity. Radiation: right low back. Associated symptoms:  none. She denies weakness, paresthesia, fevers and night sweats. Symptoms are exacerbated by walking and changing positions. Precipitating factors: started immediately after 11 hour car ride. Symptoms are worse in the morning. Prior history of similar symptoms: none. Previous treatment: acetaminophen- occasional, heat, stretching. Symptoms waxing and waning over time. Planning to see her chiropractor. ROS  As documented above    Physical Exam  Constitutional:       General: She is not in acute distress.      Appearance: She is well-developed. Abdominal:      General: Bowel sounds are normal. There is no distension. Palpations: Abdomen is soft. Tenderness: There is no abdominal tenderness. Musculoskeletal:      Comments: There is no tenderness over the lumbar spine and sacral spine. Paraspinal muscle spasm/tenderness:  Yes, Right SI joint. Skin:     General: Skin is warm and dry. Findings: No erythema or rash. Neurological:      Mental Status: She is alert and oriented to person, place, and time. Comments: No sensory or motor deficit is noted. Deep tendon reflexes are 1+ and equal bilaterally. Straight leg raise is positive right. Psychiatric:         Behavior: Behavior normal.           --Alex Breaux MD on 1/4/2022 at 9:18 AM    An electronic signature was used to authenticate this note.

## 2022-01-04 ENCOUNTER — OFFICE VISIT (OUTPATIENT)
Dept: INTERNAL MEDICINE CLINIC | Age: 32
End: 2022-01-04
Payer: COMMERCIAL

## 2022-01-04 VITALS
DIASTOLIC BLOOD PRESSURE: 60 MMHG | HEART RATE: 78 BPM | WEIGHT: 216 LBS | SYSTOLIC BLOOD PRESSURE: 94 MMHG | BODY MASS INDEX: 35.94 KG/M2

## 2022-01-04 DIAGNOSIS — M46.1 INFLAMMATION OF RIGHT SACROILIAC JOINT (HCC): ICD-10-CM

## 2022-01-04 PROCEDURE — 99213 OFFICE O/P EST LOW 20 MIN: CPT | Performed by: INTERNAL MEDICINE

## 2022-01-04 RX ORDER — METHYLPREDNISOLONE 4 MG/1
TABLET ORAL
Qty: 1 KIT | Refills: 0 | Status: SHIPPED | OUTPATIENT
Start: 2022-01-04 | End: 2022-01-10

## 2022-05-24 RX ORDER — CITALOPRAM 20 MG/1
TABLET ORAL
Qty: 90 TABLET | Refills: 1 | Status: SHIPPED | OUTPATIENT
Start: 2022-05-24

## 2022-07-21 NOTE — PROGRESS NOTES
Date of Visit:  2022    CC: Bijan Raymond (: 1990) is a 32 y.o. female, established patient, here for evaluation/re-evaluation of the following medical concerns:    ASSESSMENT/PLAN:  Right hand pain   Suspect tendinitis triggered by carrying her young children. Since improving, will treat conservatively, including ibuprofen 400-600 mg tid and ice 3x/day. If no improvement within 2 weeks, will refer to hand specialist.    Return if symptoms worsen or fail to improve. Vitals:    22 1527   BP: 110/70   Site: Right Upper Arm   Position: Sitting   Cuff Size: Large Adult   Pulse: 69   SpO2: 97%   Weight: 206 lb (93.4 kg)   Height: 5' 5\" (1.651 m)      Estimated body mass index is 34.28 kg/m² as calculated from the following:    Height as of this encounter: 5' 5\" (1.651 m). Weight as of this encounter: 206 lb (93.4 kg). Wt Readings from Last 3 Encounters:   22 206 lb (93.4 kg)   22 216 lb (98 kg)   21 213 lb (96.6 kg)     BP Readings from Last 3 Encounters:   22 110/70   22 94/60   21 118/74     HPI  Joint Symptoms:  Patient complains of a 1 month history of pain, swelling, and stiffness in right hand- dorsum, third finger. Pain is persistent, dull in nature, and is mild in intensity. Radiation: none. Associated symptoms:  none. She denies weakness and paresthesia. Symptoms are exacerbated by unscrewing jars, tying shoes, snapping fingers. Precipitating factors: no known injury, but carrying baby and toddler. Symptoms are worse in the morning. Prior history of similar symptoms: none. Previous treatment: ice. Symptoms are improving over time. ROS  As documented above    Physical Exam  Constitutional:       General: She is not in acute distress. Appearance: She is well-developed. Musculoskeletal:      Comments: Right hand:  Tenderness over 3rd MCP and extensor tendon. Normal ROM, no swelling.   Strength normal.   Skin:     General: Skin is warm and dry. Findings: No erythema or rash. Neurological:      Mental Status: She is alert and oriented to person, place, and time. Comments: No sensory or motor deficit is noted. Psychiatric:         Behavior: Behavior normal.         --Swapnil Bright MD on 7/26/2022 at 4:08 PM    An electronic signature was used to authenticate this note.

## 2022-07-26 ENCOUNTER — OFFICE VISIT (OUTPATIENT)
Dept: INTERNAL MEDICINE CLINIC | Age: 32
End: 2022-07-26
Payer: COMMERCIAL

## 2022-07-26 VITALS
BODY MASS INDEX: 34.32 KG/M2 | DIASTOLIC BLOOD PRESSURE: 70 MMHG | HEIGHT: 65 IN | HEART RATE: 69 BPM | WEIGHT: 206 LBS | SYSTOLIC BLOOD PRESSURE: 110 MMHG | OXYGEN SATURATION: 97 %

## 2022-07-26 DIAGNOSIS — M79.641 RIGHT HAND PAIN: ICD-10-CM

## 2022-07-26 PROCEDURE — 99213 OFFICE O/P EST LOW 20 MIN: CPT | Performed by: INTERNAL MEDICINE

## 2022-07-26 SDOH — ECONOMIC STABILITY: FOOD INSECURITY: WITHIN THE PAST 12 MONTHS, THE FOOD YOU BOUGHT JUST DIDN'T LAST AND YOU DIDN'T HAVE MONEY TO GET MORE.: NEVER TRUE

## 2022-07-26 SDOH — ECONOMIC STABILITY: FOOD INSECURITY: WITHIN THE PAST 12 MONTHS, YOU WORRIED THAT YOUR FOOD WOULD RUN OUT BEFORE YOU GOT MONEY TO BUY MORE.: NEVER TRUE

## 2022-07-26 ASSESSMENT — PATIENT HEALTH QUESTIONNAIRE - PHQ9
9. THOUGHTS THAT YOU WOULD BE BETTER OFF DEAD, OR OF HURTING YOURSELF: 0
SUM OF ALL RESPONSES TO PHQ QUESTIONS 1-9: 0
SUM OF ALL RESPONSES TO PHQ9 QUESTIONS 1 & 2: 0
5. POOR APPETITE OR OVEREATING: 0
SUM OF ALL RESPONSES TO PHQ QUESTIONS 1-9: 0
3. TROUBLE FALLING OR STAYING ASLEEP: 0
4. FEELING TIRED OR HAVING LITTLE ENERGY: 0
SUM OF ALL RESPONSES TO PHQ QUESTIONS 1-9: 0
7. TROUBLE CONCENTRATING ON THINGS, SUCH AS READING THE NEWSPAPER OR WATCHING TELEVISION: 0
8. MOVING OR SPEAKING SO SLOWLY THAT OTHER PEOPLE COULD HAVE NOTICED. OR THE OPPOSITE, BEING SO FIGETY OR RESTLESS THAT YOU HAVE BEEN MOVING AROUND A LOT MORE THAN USUAL: 0
10. IF YOU CHECKED OFF ANY PROBLEMS, HOW DIFFICULT HAVE THESE PROBLEMS MADE IT FOR YOU TO DO YOUR WORK, TAKE CARE OF THINGS AT HOME, OR GET ALONG WITH OTHER PEOPLE: 0
SUM OF ALL RESPONSES TO PHQ QUESTIONS 1-9: 0
6. FEELING BAD ABOUT YOURSELF - OR THAT YOU ARE A FAILURE OR HAVE LET YOURSELF OR YOUR FAMILY DOWN: 0
1. LITTLE INTEREST OR PLEASURE IN DOING THINGS: 0
2. FEELING DOWN, DEPRESSED OR HOPELESS: 0

## 2022-07-26 ASSESSMENT — SOCIAL DETERMINANTS OF HEALTH (SDOH): HOW HARD IS IT FOR YOU TO PAY FOR THE VERY BASICS LIKE FOOD, HOUSING, MEDICAL CARE, AND HEATING?: NOT HARD AT ALL

## 2022-07-26 NOTE — PATIENT INSTRUCTIONS
Ibuprofen 400-600 mg 3x/day with food for 1-2 weeks  Ice 20 minutes, 3x/day    Dermatologists:    [id number] [phone]  [facility]             [phone]  [address] [place] [address]

## 2022-11-28 RX ORDER — CITALOPRAM 20 MG/1
TABLET ORAL
Qty: 90 TABLET | Refills: 0 | Status: SHIPPED | OUTPATIENT
Start: 2022-11-28

## 2022-11-28 NOTE — TELEPHONE ENCOUNTER
Last appointment: 7/26/2022  Next appointment: Visit date not found  Last refill: 5/24/22   Sent [employer] message to schedule due/overdue appointment.

## 2022-12-05 ENCOUNTER — TELEPHONE (OUTPATIENT)
Dept: INTERNAL MEDICINE CLINIC | Age: 32
End: 2022-12-05

## 2022-12-05 DIAGNOSIS — Z00.00 ANNUAL PHYSICAL EXAM: Primary | ICD-10-CM

## 2022-12-05 NOTE — TELEPHONE ENCOUNTER
Patient is requesting routine blood work orders be submitted. She is scheduled for a physical in January 2023 but would like to complete labs this Wednesday when she is here for her flu shot. Please contact patient and let her know when lab orders have been submitted.

## 2022-12-07 ENCOUNTER — NURSE ONLY (OUTPATIENT)
Dept: INTERNAL MEDICINE CLINIC | Age: 32
End: 2022-12-07
Payer: COMMERCIAL

## 2022-12-07 DIAGNOSIS — Z00.00 ANNUAL PHYSICAL EXAM: ICD-10-CM

## 2022-12-07 PROCEDURE — 90674 CCIIV4 VAC NO PRSV 0.5 ML IM: CPT | Performed by: INTERNAL MEDICINE

## 2022-12-07 PROCEDURE — 90471 IMMUNIZATION ADMIN: CPT | Performed by: INTERNAL MEDICINE

## 2022-12-08 LAB
A/G RATIO: 1.6 (ref 1.1–2.2)
ALBUMIN SERPL-MCNC: 4.2 G/DL (ref 3.4–5)
ALP BLD-CCNC: 53 U/L (ref 40–129)
ALT SERPL-CCNC: 16 U/L (ref 10–40)
ANION GAP SERPL CALCULATED.3IONS-SCNC: 13 MMOL/L (ref 3–16)
AST SERPL-CCNC: 14 U/L (ref 15–37)
BASOPHILS ABSOLUTE: 0 K/UL (ref 0–0.2)
BASOPHILS RELATIVE PERCENT: 0.6 %
BILIRUB SERPL-MCNC: 0.5 MG/DL (ref 0–1)
BUN BLDV-MCNC: 9 MG/DL (ref 7–20)
CALCIUM SERPL-MCNC: 9 MG/DL (ref 8.3–10.6)
CHLORIDE BLD-SCNC: 102 MMOL/L (ref 99–110)
CHOLESTEROL, FASTING: 175 MG/DL (ref 0–199)
CO2: 24 MMOL/L (ref 21–32)
CREAT SERPL-MCNC: 0.5 MG/DL (ref 0.6–1.1)
EOSINOPHILS ABSOLUTE: 0.2 K/UL (ref 0–0.6)
EOSINOPHILS RELATIVE PERCENT: 2.9 %
ESTIMATED AVERAGE GLUCOSE: 91.1 MG/DL
GFR SERPL CREATININE-BSD FRML MDRD: >60 ML/MIN/{1.73_M2}
GLUCOSE FASTING: 80 MG/DL (ref 70–99)
HBA1C MFR BLD: 4.8 %
HCT VFR BLD CALC: 42.1 % (ref 36–48)
HDLC SERPL-MCNC: 69 MG/DL (ref 40–60)
HEMOGLOBIN: 13.8 G/DL (ref 12–16)
LDL CHOLESTEROL CALCULATED: 91 MG/DL
LYMPHOCYTES ABSOLUTE: 2.2 K/UL (ref 1–5.1)
LYMPHOCYTES RELATIVE PERCENT: 37.1 %
MCH RBC QN AUTO: 29.4 PG (ref 26–34)
MCHC RBC AUTO-ENTMCNC: 32.8 G/DL (ref 31–36)
MCV RBC AUTO: 89.7 FL (ref 80–100)
MONOCYTES ABSOLUTE: 0.6 K/UL (ref 0–1.3)
MONOCYTES RELATIVE PERCENT: 9.6 %
NEUTROPHILS ABSOLUTE: 2.9 K/UL (ref 1.7–7.7)
NEUTROPHILS RELATIVE PERCENT: 49.8 %
PDW BLD-RTO: 13.9 % (ref 12.4–15.4)
PLATELET # BLD: 212 K/UL (ref 135–450)
PMV BLD AUTO: 11.7 FL (ref 5–10.5)
POTASSIUM SERPL-SCNC: 4.6 MMOL/L (ref 3.5–5.1)
RBC # BLD: 4.7 M/UL (ref 4–5.2)
SODIUM BLD-SCNC: 139 MMOL/L (ref 136–145)
TOTAL PROTEIN: 6.9 G/DL (ref 6.4–8.2)
TRIGLYCERIDE, FASTING: 75 MG/DL (ref 0–150)
TSH REFLEX: 0.72 UIU/ML (ref 0.27–4.2)
VLDLC SERPL CALC-MCNC: 15 MG/DL
WBC # BLD: 5.9 K/UL (ref 4–11)

## 2022-12-30 NOTE — PROGRESS NOTES
2023    Jefferson Corral (: 1990) is a 28 y.o. female who presents for a preventive medicine evaluation. Assessment/Plan:  Annual physical exam  PAP up to date- [facility]  Bivalent COVID booster recommended  Anxiety  Assessment & Plan:  Well-controlled on current dose of Celexa- continue. Class 2 severe obesity due to excess calories with serious comorbidity and body mass index (BMI) of 35.0 to 35.9 in adult [facility])  Assessment & Plan:  No significant change. Starting to cook more at home, watching portions and avoiding processed food. Starting to train to [employer]. Major depressive disorder with single episode, in full remission [facility])  Assessment & Plan:  See plan for anxiety. Return in about 6 months (around 2023). Patient Active Problem List   Diagnosis    GERD (gastroesophageal reflux disease)    Arnold-Chiari deformity (HCC)    Allergic rhinitis    Eustachian tube disorder    Obesity due to excess calories with serious comorbidity    Dysmenorrhea    Major depressive disorder with single episode, in full remission ([address]    Anxiety    Right hand pain     Review of Systems   Constitutional: Negative. HENT: Negative. Eyes: Negative. Respiratory: Negative. Cardiovascular: Negative. Gastrointestinal: Negative. Genitourinary: Negative. Musculoskeletal: Negative. Skin: Negative. Neurological: Negative. Psychiatric/Behavioral: Negative. No Known Allergies  Prior to Visit Medications    Medication Sig Taking?  Authorizing Provider   citalopram (CELEXA) 20 MG tablet TAKE 1 TABLET DAILY Yes Wally Shepard MD   Multiple Vitamins-Minerals (MULTIVITAMIN ADULT PO) Take by mouth daily Yes Historical Provider, MD        Past Medical History:   Diagnosis Date    Allergic rhinitis     Arnold-Chiari deformity (HCC)     Dysmenorrhea     Eustachian tube disorder     Gallstones     GERD (gastroesophageal reflux disease)     HELLP (hemolytic anemia/elev liver enzymes/low platelets in pregnancy) 10/2020     Past Surgical History:   Procedure Laterality Date    [facility] SURGERY  2009    Cyst removal related to Chiari malformation    BRAIN SURGERY  2014    Chiari decompression     SECTION  2020     SECTION  10/29/2021    CHOLECYSTECTOMY  2016    TYMPANOSTOMY TUBE PLACEMENT Bilateral     x11- last      Family History   Problem Relation Age of Onset    Anxiety Disorder Sister     Diabetes Maternal Uncle     Colon Cancer Paternal Grandmother 68    Esophageal Cancer Maternal Grandfather 76    Hypertension Mother     Rheum Arthritis Mother     Rheum Arthritis Maternal Grandmother        Vitals:    23 0851   BP: 110/74   Site: Right Upper Arm   Position: Sitting   Cuff Size: Medium Adult   Pulse: 76   SpO2: 98%   Weight: 208 lb (94.3 kg)   Height: 5' 4.5\" (1.638 m)      Estimated body mass index is 35.15 kg/m² as calculated from the following:    Height as of this encounter: 5' 4.5\" (1.638 m). Weight as of this encounter: 208 lb (94.3 kg). Wt Readings from Last 3 Encounters:   23 208 lb (94.3 kg)   22 206 lb (93.4 kg)   22 216 lb (98 kg)     BP Readings from Last 3 Encounters:   23 110/74   22 110/70   22 94/60       Physical Exam  Constitutional:       General: She is not in acute distress. Appearance: She is well-developed. HENT:      Head: Normocephalic and atraumatic. Right Ear: Tympanic membrane, ear canal and external ear normal.      Left Ear: Tympanic membrane, ear canal and external ear normal.      Mouth/Throat:      Mouth: Mucous membranes are moist.      Pharynx: Oropharynx is clear. Eyes:      General: Lids are normal.      Conjunctiva/sclera: Conjunctivae normal.      Pupils: Pupils are equal, round, and reactive to light. Neck:      Thyroid: No thyroid mass or thyromegaly. Vascular: No carotid bruit.    Cardiovascular:      Rate and Rhythm: Normal rate and regular rhythm. Pulses: Normal pulses. Heart sounds: Normal heart sounds. No murmur heard. No friction rub. No gallop. Pulmonary:      Effort: Pulmonary effort is normal. No respiratory distress. Breath sounds: Normal breath sounds. No wheezing, rhonchi or rales. Abdominal:      General: Bowel sounds are normal. There is no distension. Palpations: Abdomen is soft. There is no mass. Tenderness: There is no abdominal tenderness. Musculoskeletal:         General: No tenderness. Normal range of motion. Cervical back: Neck supple. Right lower leg: No edema. Left lower leg: No edema. Lymphadenopathy:      Cervical: No cervical adenopathy. Skin:     General: Skin is warm and dry. Findings: No erythema or rash. Comments: No suspicious lesions. Neurological:      Mental Status: She is alert and oriented to person, place, and time. Coordination: Coordination normal.      Deep Tendon Reflexes: Reflexes are normal and symmetric. Psychiatric:         Mood and Affect: Mood normal.         Speech: Speech normal.         Behavior: Behavior normal.         Thought Content:  Thought content normal.         Cognition and Memory: Cognition normal.         Judgment: Judgment normal.       Lab Results   Component Value Date    CHOLFAST 175 12/07/2022    TRIGLYCFAST 75 12/07/2022    HDL 69 (H) 12/07/2022    LDLCALC 91 12/07/2022     Lab Results   Component Value Date    GLUF 80 12/07/2022    LABA1C 4.8 12/07/2022     Lab Results   Component Value Date     12/07/2022    K 4.6 12/07/2022    BUN 9 12/07/2022    CREATININE 0.5 (L) 12/07/2022    LABGLOM >60 12/07/2022    GFRAA >60 12/15/2021    CALCIUM 9.0 12/07/2022     Lab Results   Component Value Date    ALT 16 12/07/2022    AST 14 (L) 12/07/2022     Lab Results   Component Value Date    HGB 13.8 12/07/2022     Lab Results   Component Value Date    TSHREFLEX 0.72 12/07/2022    TSH 1.32 04/02/2019 Social History     Tobacco Use    Smoking status: Never    Smokeless tobacco: Never   Substance Use Topics    Alcohol use: Yes     Comment: Social     Social History     Substance and Sexual Activity   Sexual Activity Never       Immunization History   Administered Date(s) Administered    COVID-19, PFIZER PURPLE top, DILUTE for use, (age 15 y+), 30mcg/0.3mL 01/29/2021, 02/20/2021, 01/03/2022    DTaP 02/04/1991, 04/15/1991, 05/31/1991, 06/06/1992, 03/29/1996    DTaP vaccine 02/04/1991, 04/15/1991, 05/31/1991, 06/06/1992, 03/29/1996    HPV Quadrivalent (Gardasil) 07/01/2008    Hepatitis B 03/31/2000, 05/11/2000, 10/11/2000    Hepatitis B Ped/Adol (Engerix-B, Recombivax HB) 03/31/2000, 03/31/2000, 05/11/2000, 05/11/2000, 10/11/2000, 10/11/2000    Hib PRP-OMP (PedvaxHIB) 02/04/1991, 04/15/1991, 05/31/1991, 06/06/1992    Hib, unspecified 02/04/1991, 04/15/1991, 05/31/1991, 06/06/1992    Influenza Virus Vaccine 10/07/2020    Influenza, FLUCELVAX, (age 10 mo+), MDCK, PF, 0.5mL 12/07/2022    MMR 06/06/1992, 03/29/2002, 10/07/2020    Meningococcal MCV4P (Menactra) 07/01/2008    Polio OPV 02/04/1991, 04/15/1991, 06/06/1992, 03/29/1996    Td (Adult), 5 Lf Tetanus Toxoid, Pf (Tenivac, Decavac) 10/04/2002    Td, unspecified formulation 10/04/2002    Tdap (Boostrix, Adacel) 05/27/2008, 05/04/2019, 08/20/2020     Health Maintenance   Topic Date Due    Cervical cancer screen  02/21/2022    COVID-19 Vaccine (4 - Booster for Pfizer series) 02/28/2022    Depression Monitoring  01/06/2024    DTaP/Tdap/Td vaccine (10 - Td or Tdap) 08/31/2031    Hib vaccine  Completed    Meningococcal (ACWY) vaccine  Completed    Flu vaccine  Completed    Hepatitis A vaccine  Aged Out    Pneumococcal 0-64 years Vaccine  Aged Out    Varicella vaccine  Discontinued    Hepatitis C screen  Discontinued    HIV screen  Discontinued       --Justyn Caputo MD on 1/6/2023 at 9:15 AM    An electronic signature was used to authenticate this note.

## 2023-01-06 ENCOUNTER — OFFICE VISIT (OUTPATIENT)
Dept: INTERNAL MEDICINE CLINIC | Age: 33
End: 2023-01-06
Payer: COMMERCIAL

## 2023-01-06 VITALS
DIASTOLIC BLOOD PRESSURE: 74 MMHG | SYSTOLIC BLOOD PRESSURE: 110 MMHG | HEIGHT: 65 IN | OXYGEN SATURATION: 98 % | HEART RATE: 76 BPM | WEIGHT: 208 LBS | BODY MASS INDEX: 34.66 KG/M2

## 2023-01-06 DIAGNOSIS — Z00.00 ANNUAL PHYSICAL EXAM: Primary | ICD-10-CM

## 2023-01-06 DIAGNOSIS — F41.9 ANXIETY: ICD-10-CM

## 2023-01-06 DIAGNOSIS — E66.01 CLASS 2 SEVERE OBESITY DUE TO EXCESS CALORIES WITH SERIOUS COMORBIDITY AND BODY MASS INDEX (BMI) OF 35.0 TO 35.9 IN ADULT (HCC): ICD-10-CM

## 2023-01-06 DIAGNOSIS — F32.5 MAJOR DEPRESSIVE DISORDER WITH SINGLE EPISODE, IN FULL REMISSION (HCC): ICD-10-CM

## 2023-01-06 PROBLEM — M46.1 INFLAMMATION OF RIGHT SACROILIAC JOINT (HCC): Status: RESOLVED | Noted: 2022-01-04 | Resolved: 2023-01-06

## 2023-01-06 PROCEDURE — 99395 PREV VISIT EST AGE 18-39: CPT | Performed by: INTERNAL MEDICINE

## 2023-01-06 ASSESSMENT — ENCOUNTER SYMPTOMS
GASTROINTESTINAL NEGATIVE: 1
EYES NEGATIVE: 1
RESPIRATORY NEGATIVE: 1

## 2023-01-06 ASSESSMENT — PATIENT HEALTH QUESTIONNAIRE - PHQ9
5. POOR APPETITE OR OVEREATING: 0
2. FEELING DOWN, DEPRESSED OR HOPELESS: 0
SUM OF ALL RESPONSES TO PHQ9 QUESTIONS 1 & 2: 0
8. MOVING OR SPEAKING SO SLOWLY THAT OTHER PEOPLE COULD HAVE NOTICED. OR THE OPPOSITE, BEING SO FIGETY OR RESTLESS THAT YOU HAVE BEEN MOVING AROUND A LOT MORE THAN USUAL: 0
10. IF YOU CHECKED OFF ANY PROBLEMS, HOW DIFFICULT HAVE THESE PROBLEMS MADE IT FOR YOU TO DO YOUR WORK, TAKE CARE OF THINGS AT HOME, OR GET ALONG WITH OTHER PEOPLE: 0
6. FEELING BAD ABOUT YOURSELF - OR THAT YOU ARE A FAILURE OR HAVE LET YOURSELF OR YOUR FAMILY DOWN: 0
SUM OF ALL RESPONSES TO PHQ QUESTIONS 1-9: 0
1. LITTLE INTEREST OR PLEASURE IN DOING THINGS: 0
9. THOUGHTS THAT YOU WOULD BE BETTER OFF DEAD, OR OF HURTING YOURSELF: 0
4. FEELING TIRED OR HAVING LITTLE ENERGY: 0
SUM OF ALL RESPONSES TO PHQ QUESTIONS 1-9: 0
3. TROUBLE FALLING OR STAYING ASLEEP: 0
7. TROUBLE CONCENTRATING ON THINGS, SUCH AS READING THE NEWSPAPER OR WATCHING TELEVISION: 0
SUM OF ALL RESPONSES TO PHQ QUESTIONS 1-9: 0
SUM OF ALL RESPONSES TO PHQ QUESTIONS 1-9: 0

## 2023-01-06 NOTE — ASSESSMENT & PLAN NOTE
No significant change. Starting to cook more at home, watching portions and avoiding processed food. Starting to train to [employer].

## 2023-02-21 NOTE — ASSESSMENT & PLAN NOTE
Well-controlled on current dose of Celexa- continue. has not been to school since December [employer] [place]; has not been to school since December

## 2023-02-24 RX ORDER — CITALOPRAM 20 MG/1
TABLET ORAL
Qty: 90 TABLET | Refills: 1 | Status: SHIPPED | OUTPATIENT
Start: 2023-02-24

## 2023-07-12 ENCOUNTER — TELEPHONE (OUTPATIENT)
Dept: INTERNAL MEDICINE CLINIC | Age: 33
End: 2023-07-12

## 2023-07-12 SDOH — ECONOMIC STABILITY: TRANSPORTATION INSECURITY
IN THE PAST 12 MONTHS, HAS LACK OF TRANSPORTATION KEPT YOU FROM MEETINGS, WORK, OR FROM GETTING THINGS NEEDED FOR DAILY LIVING?: NO

## 2023-07-12 SDOH — ECONOMIC STABILITY: INCOME INSECURITY: HOW HARD IS IT FOR YOU TO PAY FOR THE VERY BASICS LIKE FOOD, HOUSING, MEDICAL CARE, AND HEATING?: NOT HARD AT ALL

## 2023-07-12 SDOH — ECONOMIC STABILITY: HOUSING INSECURITY
IN THE LAST 12 MONTHS, WAS THERE A TIME WHEN YOU DID NOT HAVE A STEADY PLACE TO SLEEP OR SLEPT IN A SHELTER (INCLUDING NOW)?: NO

## 2023-07-12 SDOH — ECONOMIC STABILITY: FOOD INSECURITY: WITHIN THE PAST 12 MONTHS, YOU WORRIED THAT YOUR FOOD WOULD RUN OUT BEFORE YOU GOT MONEY TO BUY MORE.: NEVER TRUE

## 2023-07-12 SDOH — ECONOMIC STABILITY: FOOD INSECURITY: WITHIN THE PAST 12 MONTHS, THE FOOD YOU BOUGHT JUST DIDN'T LAST AND YOU DIDN'T HAVE MONEY TO GET MORE.: NEVER TRUE

## 2023-07-12 NOTE — TELEPHONE ENCOUNTER
Patient is scheduled for VV tomorrow with Dr. Vo Daily at 11:30 for sore throat and fullness in right ear. I advised her to do an e-visit because she called back thinking her VV was today instead of tomorrow. She states the sore throat is really bad. After e-visit is  completed please advise patient if she should keep her current VV tomorrow or be scheduled sooner/differently. Thanks.

## 2023-07-13 ENCOUNTER — TELEMEDICINE (OUTPATIENT)
Dept: INTERNAL MEDICINE CLINIC | Age: 33
End: 2023-07-13
Payer: COMMERCIAL

## 2023-07-13 DIAGNOSIS — J06.9 VIRAL URI: Primary | ICD-10-CM

## 2023-07-13 PROCEDURE — 99213 OFFICE O/P EST LOW 20 MIN: CPT | Performed by: INTERNAL MEDICINE

## 2023-08-10 RX ORDER — CITALOPRAM 20 MG/1
TABLET ORAL
Qty: 90 TABLET | Refills: 1 | Status: SHIPPED | OUTPATIENT
Start: 2023-08-10

## 2023-08-10 NOTE — TELEPHONE ENCOUNTER
Last appointment: 7/13/2023  Next appointment: Visit date not found  Last refill: 2/24/23  Sent [employer] message to schedule due/overdue appointment.

## 2024-03-12 ASSESSMENT — PATIENT HEALTH QUESTIONNAIRE - PHQ9
1. LITTLE INTEREST OR PLEASURE IN DOING THINGS: 0
6. FEELING BAD ABOUT YOURSELF - OR THAT YOU ARE A FAILURE OR HAVE LET YOURSELF OR YOUR FAMILY DOWN: 0
9. THOUGHTS THAT YOU WOULD BE BETTER OFF DEAD, OR OF HURTING YOURSELF: NOT AT ALL
SUM OF ALL RESPONSES TO PHQ QUESTIONS 1-9: 0
2. FEELING DOWN, DEPRESSED OR HOPELESS: NOT AT ALL
6. FEELING BAD ABOUT YOURSELF - OR THAT YOU ARE A FAILURE OR HAVE LET YOURSELF OR YOUR FAMILY DOWN: NOT AT ALL
SUM OF ALL RESPONSES TO PHQ QUESTIONS 1-9: 0
7. TROUBLE CONCENTRATING ON THINGS, SUCH AS READING THE NEWSPAPER OR WATCHING TELEVISION: 0
8. MOVING OR SPEAKING SO SLOWLY THAT OTHER PEOPLE COULD HAVE NOTICED. OR THE OPPOSITE - BEING SO FIDGETY OR RESTLESS THAT YOU HAVE BEEN MOVING AROUND A LOT MORE THAN USUAL: NOT AT ALL
SUM OF ALL RESPONSES TO PHQ QUESTIONS 1-9: 0
3. TROUBLE FALLING OR STAYING ASLEEP: 0
5. POOR APPETITE OR OVEREATING: 0
7. TROUBLE CONCENTRATING ON THINGS, SUCH AS READING THE NEWSPAPER OR WATCHING TELEVISION: NOT AT ALL
SUM OF ALL RESPONSES TO PHQ QUESTIONS 1-9: 0
SUM OF ALL RESPONSES TO PHQ QUESTIONS 1-9: 0
9. THOUGHTS THAT YOU WOULD BE BETTER OFF DEAD, OR OF HURTING YOURSELF: 0
8. MOVING OR SPEAKING SO SLOWLY THAT OTHER PEOPLE COULD HAVE NOTICED. OR THE OPPOSITE, BEING SO FIGETY OR RESTLESS THAT YOU HAVE BEEN MOVING AROUND A LOT MORE THAN USUAL: 0
5. POOR APPETITE OR OVEREATING: NOT AT ALL
10. IF YOU CHECKED OFF ANY PROBLEMS, HOW DIFFICULT HAVE THESE PROBLEMS MADE IT FOR YOU TO DO YOUR WORK, TAKE CARE OF THINGS AT HOME, OR GET ALONG WITH OTHER PEOPLE: 0
1. LITTLE INTEREST OR PLEASURE IN DOING THINGS: NOT AT ALL
4. FEELING TIRED OR HAVING LITTLE ENERGY: NOT AT ALL
SUM OF ALL RESPONSES TO PHQ9 QUESTIONS 1 & 2: 0
2. FEELING DOWN, DEPRESSED OR HOPELESS: 0
4. FEELING TIRED OR HAVING LITTLE ENERGY: 0
3. TROUBLE FALLING OR STAYING ASLEEP: NOT AT ALL
10. IF YOU CHECKED OFF ANY PROBLEMS, HOW DIFFICULT HAVE THESE PROBLEMS MADE IT FOR YOU TO DO YOUR WORK, TAKE CARE OF THINGS AT HOME, OR GET ALONG WITH OTHER PEOPLE: NOT DIFFICULT AT ALL

## 2024-03-13 NOTE — ASSESSMENT & PLAN NOTE
Asymptomatic status post decompression 4/14.  She will follow-up with her neurosurgeon if symptoms recur.

## 2024-03-13 NOTE — PROGRESS NOTES
series) 07/29/2008    Cervical cancer screen  02/21/2022    COVID-19 Vaccine (4 - 2023-24 season) 09/01/2023    Depression Monitoring  03/12/2025    DTaP/Tdap/Td vaccine (10 - Td or Tdap) 08/31/2031    Hepatitis B vaccine  Completed    Hib vaccine  Completed    Polio vaccine  Completed    Meningococcal (ACWY) vaccine  Completed    Flu vaccine  Completed    Hepatitis A vaccine  Aged Out    Pneumococcal 0-64 years Vaccine  Aged Out    Varicella vaccine  Discontinued    Hepatitis C screen  Discontinued    HIV screen  Discontinued       --Arlene Benítez MD on 3/15/2024 at 3:07 PM    An electronic signature was used to authenticate this note.

## 2024-03-15 ENCOUNTER — OFFICE VISIT (OUTPATIENT)
Dept: INTERNAL MEDICINE CLINIC | Age: 34
End: 2024-03-15
Payer: COMMERCIAL

## 2024-03-15 ENCOUNTER — TELEPHONE (OUTPATIENT)
Dept: INTERNAL MEDICINE CLINIC | Age: 34
End: 2024-03-15

## 2024-03-15 VITALS
OXYGEN SATURATION: 99 % | WEIGHT: 208 LBS | HEIGHT: 65 IN | SYSTOLIC BLOOD PRESSURE: 102 MMHG | BODY MASS INDEX: 34.66 KG/M2 | HEART RATE: 85 BPM | DIASTOLIC BLOOD PRESSURE: 70 MMHG

## 2024-03-15 DIAGNOSIS — E66.09 CLASS 1 OBESITY DUE TO EXCESS CALORIES WITH SERIOUS COMORBIDITY AND BODY MASS INDEX (BMI) OF 34.0 TO 34.9 IN ADULT: ICD-10-CM

## 2024-03-15 DIAGNOSIS — Q07.00 ARNOLD-CHIARI DEFORMITY (HCC): ICD-10-CM

## 2024-03-15 DIAGNOSIS — F32.5 MAJOR DEPRESSIVE DISORDER WITH SINGLE EPISODE, IN FULL REMISSION (HCC): ICD-10-CM

## 2024-03-15 DIAGNOSIS — F41.9 ANXIETY: ICD-10-CM

## 2024-03-15 DIAGNOSIS — Z00.00 ANNUAL PHYSICAL EXAM: Primary | ICD-10-CM

## 2024-03-15 PROBLEM — M79.641 RIGHT HAND PAIN: Status: RESOLVED | Noted: 2022-07-26 | Resolved: 2024-03-15

## 2024-03-15 PROCEDURE — 99395 PREV VISIT EST AGE 18-39: CPT | Performed by: INTERNAL MEDICINE

## 2024-03-15 RX ORDER — CITALOPRAM 20 MG/1
20 TABLET ORAL DAILY
Qty: 90 TABLET | Refills: 1 | Status: SHIPPED | OUTPATIENT
Start: 2024-03-15

## 2024-03-15 ASSESSMENT — ENCOUNTER SYMPTOMS
GASTROINTESTINAL NEGATIVE: 1
RESPIRATORY NEGATIVE: 1
EYES NEGATIVE: 1

## 2024-03-15 NOTE — TELEPHONE ENCOUNTER
Benítez patient is requesting to transfer care to Dr. Darling.  Her  is current patient of Dr. Darling.  Please advise.

## 2024-03-15 NOTE — ASSESSMENT & PLAN NOTE
No significant change. Starting to cook more at home, watching portions and avoiding processed food. Starting to train to [employer].

## 2024-03-21 RX ORDER — CITALOPRAM 20 MG/1
TABLET ORAL
Qty: 90 TABLET | Refills: 0 | OUTPATIENT
Start: 2024-03-21

## 2024-03-22 DIAGNOSIS — Z00.00 ANNUAL PHYSICAL EXAM: ICD-10-CM

## 2024-03-22 LAB
ALBUMIN SERPL-MCNC: 4.5 G/DL (ref 3.4–5)
ALBUMIN/GLOB SERPL: 1.8 {RATIO} (ref 1.1–2.2)
ALP SERPL-CCNC: 63 U/L (ref 40–129)
ALT SERPL-CCNC: 13 U/L (ref 10–40)
ANION GAP SERPL CALCULATED.3IONS-SCNC: 12 MMOL/L (ref 3–16)
AST SERPL-CCNC: 14 U/L (ref 15–37)
BILIRUB SERPL-MCNC: <0.2 MG/DL (ref 0–1)
BUN SERPL-MCNC: 12 MG/DL (ref 7–20)
CALCIUM SERPL-MCNC: 9 MG/DL (ref 8.3–10.6)
CHLORIDE SERPL-SCNC: 108 MMOL/L (ref 99–110)
CHOLEST SERPL-MCNC: 158 MG/DL (ref 0–199)
CO2 SERPL-SCNC: 21 MMOL/L (ref 21–32)
CREAT SERPL-MCNC: 0.7 MG/DL (ref 0.6–1.1)
GFR SERPLBLD CREATININE-BSD FMLA CKD-EPI: >60 ML/MIN/{1.73_M2}
GLUCOSE P FAST SERPL-MCNC: 91 MG/DL (ref 70–99)
HDLC SERPL-MCNC: 59 MG/DL (ref 40–60)
LDL CHOLESTEROL CALCULATED: 79 MG/DL
POTASSIUM SERPL-SCNC: 4.8 MMOL/L (ref 3.5–5.1)
PROT SERPL-MCNC: 7 G/DL (ref 6.4–8.2)
SODIUM SERPL-SCNC: 141 MMOL/L (ref 136–145)
TRIGL SERPL-MCNC: 100 MG/DL (ref 0–150)
TSH SERPL DL<=0.005 MIU/L-ACNC: 1.31 UIU/ML (ref 0.27–4.2)
VLDLC SERPL CALC-MCNC: 20 MG/DL

## 2024-05-02 RX ORDER — CITALOPRAM 20 MG/1
TABLET ORAL
Qty: 90 TABLET | Refills: 1 | Status: SHIPPED | OUTPATIENT
Start: 2024-05-02

## 2024-05-02 NOTE — TELEPHONE ENCOUNTER
Patient is calling in for  to make sure medication was sent to the right mail order pharmacy:    [facility] - [address] [facility] [phone] - F [phone]

## 2024-05-14 ENCOUNTER — TELEPHONE (OUTPATIENT)
Dept: INTERNAL MEDICINE CLINIC | Age: 34
End: 2024-05-14

## 2024-05-14 RX ORDER — CITALOPRAM 20 MG/1
TABLET ORAL
Qty: 90 TABLET | Refills: 0 | Status: SHIPPED | OUTPATIENT
Start: 2024-05-14

## 2024-05-14 NOTE — TELEPHONE ENCOUNTER
Patient of Dr. Orantes is wanting to see if she can have her Rx sent to another pharmacy for a refill, please advise instead of getting it through the mail order.    Medication requesting refill for:  citalopram (CELEXA) 20 MG tablet   Last appointment: 3/15/2024  Next appointment: Visit date not found  Last refill: 5/2/2024      Patient stated she didn't receive the one for 5/2/24 and gets a 90 day tablet, the last time she received a mail order for the Rx was on 11/2/2023.     Preferred pharmacy to send the medication refill:   [id number] [address] - P [phone] - F [phone] [[id number]]

## 2024-05-20 ENCOUNTER — TELEPHONE (OUTPATIENT)
Dept: INTERNAL MEDICINE CLINIC | Age: 34
End: 2024-05-20

## 2024-05-20 NOTE — TELEPHONE ENCOUNTER
Pt called today and has been trying to get a refill on CITALOPRAM 20 MG TABLET from Dr. Benítez for a while now and for some reason she was never called back. She stated that the initial rx that was refilled on 3-15-24 was sent to a mail order pharmacy but she never received it. It was then D/C on 5-2-24.  She has a new patient appt with Dr. Darling on 9- and is asking if she can get [facility]  because she doesn't have any medication. Last rx she had filled last year. Please give her a call back, thx.       Preferred pharmacy to send the medication refill:   [facility] #[id number] [facility] [address] - P [phone] - F [phone] [[id number]]

## 2024-05-21 DIAGNOSIS — F32.5 MAJOR DEPRESSIVE DISORDER WITH SINGLE EPISODE, IN FULL REMISSION (HCC): ICD-10-CM

## 2024-05-21 DIAGNOSIS — F41.9 ANXIETY: Primary | ICD-10-CM

## 2024-05-21 RX ORDER — CITALOPRAM 20 MG/1
TABLET ORAL
Qty: 90 TABLET | Refills: 0 | Status: CANCELLED | OUTPATIENT
Start: 2024-05-21

## 2024-05-21 RX ORDER — CITALOPRAM 20 MG/1
20 TABLET ORAL DAILY
Qty: 30 TABLET | Refills: 0 | Status: SHIPPED | OUTPATIENT
Start: 2024-05-21

## 2024-05-21 RX ORDER — CITALOPRAM 20 MG/1
TABLET ORAL
Qty: 30 TABLET | Refills: 0 | Status: SHIPPED | OUTPATIENT
Start: 2024-05-21 | End: 2024-05-21 | Stop reason: DRUGHIGH

## 2024-05-21 NOTE — TELEPHONE ENCOUNTER
I called the pharmacy, and they said they did not receive the electronic transmission of the 90 day supply sent by CNP on 5/14    I am setting this back up for cnp to release again.   I will call pharm and verify received, and then update the patient.

## 2024-05-21 NOTE — TELEPHONE ENCOUNTER
Called and updated patient.   And reassured her I would f/u as soon as I know the pharm has rec new script

## 2024-05-22 NOTE — TELEPHONE ENCOUNTER
Patient called back regarding medication request, did try to  the medication at the pharmacy but Daniel told the patient that they couldn't fill it due the instructions not being clear on how the patient should take the medication, please advise.     Patients callback# [phone]

## 2024-05-23 RX ORDER — CITALOPRAM 20 MG/1
20 TABLET ORAL DAILY
Qty: 30 TABLET | Refills: 0 | OUTPATIENT
Start: 2024-05-23

## 2024-06-14 RX ORDER — CITALOPRAM 20 MG/1
20 TABLET ORAL DAILY
Qty: 30 TABLET | Refills: 0 | OUTPATIENT
Start: 2024-06-14

## 2024-06-17 DIAGNOSIS — F32.5 MAJOR DEPRESSIVE DISORDER WITH SINGLE EPISODE, IN FULL REMISSION (HCC): ICD-10-CM

## 2024-06-17 DIAGNOSIS — F41.9 ANXIETY: Primary | ICD-10-CM

## 2024-06-17 RX ORDER — CITALOPRAM 20 MG/1
20 TABLET ORAL DAILY
Qty: 30 TABLET | Refills: 1 | Status: SHIPPED | OUTPATIENT
Start: 2024-06-17

## 2024-06-17 NOTE — TELEPHONE ENCOUNTER
Patient called in to see if her medication can be refilled, will be a new patient of Dr. Darling this upcoming september and would like see if she can get a fill on the Rx:    citalopram (CELEXA) 20 MG tablet     Last appointment: 3/15/2024  Next appointment: Visit date not found  Last refill: 5/21/2024      Please advise if the patient can get this refilled, did a previous request in another encounter. Did let the patient know may not get refilled due to her not being a patient yet.    Patients Preferred Pharmacy:   [facility] #[id number] [place], [place] - [address] - P [phone] - F [phone]  [address] [place] [phone]  Phone: [phone]  Fax: [phone]

## 2024-06-17 NOTE — TELEPHONE ENCOUNTER
abi called in to see if her medication can be refilled, will be a new patient of Dr. Darling this upcoming september and would like see if she can get a fill on the Rx:     citalopram (CELEXA) 20 MG tablet      Last appointment: 3/15/2024  Next appointment: Visit date not found  Last refill: 5/21/2024  per covering cnp    Pended

## 2024-09-23 SDOH — HEALTH STABILITY: PHYSICAL HEALTH: ON AVERAGE, HOW MANY MINUTES DO YOU ENGAGE IN EXERCISE AT THIS LEVEL?: 30 MIN

## 2024-09-23 SDOH — HEALTH STABILITY: PHYSICAL HEALTH: ON AVERAGE, HOW MANY DAYS PER WEEK DO YOU ENGAGE IN MODERATE TO STRENUOUS EXERCISE (LIKE A BRISK WALK)?: 4 DAYS

## 2024-09-26 ENCOUNTER — TELEPHONE (OUTPATIENT)
Dept: INTERNAL MEDICINE CLINIC | Age: 34
End: 2024-09-26

## 2024-09-26 ENCOUNTER — OFFICE VISIT (OUTPATIENT)
Dept: INTERNAL MEDICINE CLINIC | Age: 34
End: 2024-09-26
Payer: COMMERCIAL

## 2024-09-26 VITALS
HEART RATE: 75 BPM | BODY MASS INDEX: 34.91 KG/M2 | WEIGHT: 209.8 LBS | DIASTOLIC BLOOD PRESSURE: 74 MMHG | OXYGEN SATURATION: 99 % | SYSTOLIC BLOOD PRESSURE: 110 MMHG

## 2024-09-26 DIAGNOSIS — F41.9 ANXIETY: ICD-10-CM

## 2024-09-26 DIAGNOSIS — Z76.89 ENCOUNTER TO ESTABLISH CARE: Primary | ICD-10-CM

## 2024-09-26 DIAGNOSIS — F32.5 MAJOR DEPRESSIVE DISORDER WITH SINGLE EPISODE, IN FULL REMISSION (HCC): ICD-10-CM

## 2024-09-26 DIAGNOSIS — L98.9 SKIN DISORDER: ICD-10-CM

## 2024-09-26 PROCEDURE — 99214 OFFICE O/P EST MOD 30 MIN: CPT | Performed by: INTERNAL MEDICINE

## 2024-09-26 RX ORDER — CITALOPRAM HYDROBROMIDE 10 MG/1
10 TABLET ORAL DAILY
Qty: 90 TABLET | Refills: 1 | Status: SHIPPED | OUTPATIENT
Start: 2024-09-26 | End: 2025-03-25

## 2024-09-26 SDOH — ECONOMIC STABILITY: FOOD INSECURITY: WITHIN THE PAST 12 MONTHS, THE FOOD YOU BOUGHT JUST DIDN'T LAST AND YOU DIDN'T HAVE MONEY TO GET MORE.: NEVER TRUE

## 2024-09-26 SDOH — ECONOMIC STABILITY: FOOD INSECURITY: WITHIN THE PAST 12 MONTHS, YOU WORRIED THAT YOUR FOOD WOULD RUN OUT BEFORE YOU GOT MONEY TO BUY MORE.: NEVER TRUE

## 2024-09-26 SDOH — ECONOMIC STABILITY: INCOME INSECURITY: HOW HARD IS IT FOR YOU TO PAY FOR THE VERY BASICS LIKE FOOD, HOUSING, MEDICAL CARE, AND HEATING?: NOT HARD AT ALL

## 2025-03-30 DIAGNOSIS — F32.5 MAJOR DEPRESSIVE DISORDER WITH SINGLE EPISODE, IN FULL REMISSION: ICD-10-CM

## 2025-03-30 DIAGNOSIS — F41.9 ANXIETY: ICD-10-CM

## 2025-03-31 RX ORDER — CITALOPRAM HYDROBROMIDE 10 MG/1
10 TABLET ORAL DAILY
Qty: 90 TABLET | Refills: 0 | Status: SHIPPED | OUTPATIENT
Start: 2025-03-31 | End: 2025-09-27

## 2025-03-31 NOTE — TELEPHONE ENCOUNTER
Last appointment: 9/26/2024  Next appointment: 4/3/2025  Last refill: ***  {Message to Schedule (Optional):20717}

## 2025-06-25 DIAGNOSIS — F41.9 ANXIETY: ICD-10-CM

## 2025-06-25 DIAGNOSIS — F32.5 MAJOR DEPRESSIVE DISORDER WITH SINGLE EPISODE, IN FULL REMISSION: ICD-10-CM

## 2025-06-25 RX ORDER — CITALOPRAM HYDROBROMIDE 10 MG/1
10 TABLET ORAL DAILY
Qty: 30 TABLET | Refills: 0 | Status: SHIPPED | OUTPATIENT
Start: 2025-06-25 | End: 2025-07-25

## 2025-06-25 NOTE — TELEPHONE ENCOUNTER
Last appointment: 9/26/2024  Next appointment: Visit date not found    Return in about 6 months (around 3/26/2025).  Last refill: 03/31/2025  Sent [employer] message to schedule due/overdue appointment.     Pended 30 days only

## 2025-07-23 DIAGNOSIS — F41.9 ANXIETY: ICD-10-CM

## 2025-07-23 DIAGNOSIS — F32.5 MAJOR DEPRESSIVE DISORDER WITH SINGLE EPISODE, IN FULL REMISSION: ICD-10-CM

## 2025-07-23 RX ORDER — CITALOPRAM HYDROBROMIDE 10 MG/1
10 TABLET ORAL DAILY
Qty: 30 TABLET | Refills: 0 | Status: SHIPPED | OUTPATIENT
Start: 2025-07-23 | End: 2025-08-22

## 2025-08-15 DIAGNOSIS — F41.9 ANXIETY: ICD-10-CM

## 2025-08-15 DIAGNOSIS — F32.5 MAJOR DEPRESSIVE DISORDER WITH SINGLE EPISODE, IN FULL REMISSION: ICD-10-CM

## 2025-08-18 RX ORDER — CITALOPRAM HYDROBROMIDE 10 MG/1
10 TABLET ORAL DAILY
Qty: 30 TABLET | Refills: 0 | Status: SHIPPED | OUTPATIENT
Start: 2025-08-18 | End: 2025-09-17